# Patient Record
Sex: MALE | Race: WHITE | NOT HISPANIC OR LATINO | Employment: OTHER
[De-identification: names, ages, dates, MRNs, and addresses within clinical notes are randomized per-mention and may not be internally consistent; named-entity substitution may affect disease eponyms.]

---

## 2017-04-25 ENCOUNTER — RECORDS - HEALTHEAST (OUTPATIENT)
Dept: ADMINISTRATIVE | Facility: OTHER | Age: 67
End: 2017-04-25

## 2017-12-12 ENCOUNTER — RECORDS - HEALTHEAST (OUTPATIENT)
Dept: ADMINISTRATIVE | Facility: OTHER | Age: 67
End: 2017-12-12

## 2018-02-03 ENCOUNTER — HOSPITAL ENCOUNTER (EMERGENCY)
Facility: CLINIC | Age: 68
Discharge: HOME | End: 2018-02-03
Attending: EMERGENCY MEDICINE | Admitting: EMERGENCY MEDICINE
Payer: MEDICARE

## 2018-02-03 VITALS
RESPIRATION RATE: 18 BRPM | TEMPERATURE: 98 F | HEART RATE: 59 BPM | DIASTOLIC BLOOD PRESSURE: 97 MMHG | WEIGHT: 252.2 LBS | BODY MASS INDEX: 36.11 KG/M2 | HEIGHT: 70 IN | OXYGEN SATURATION: 97 % | SYSTOLIC BLOOD PRESSURE: 147 MMHG

## 2018-02-03 DIAGNOSIS — H20.9 UVEITIS, ANTERIOR: ICD-10-CM

## 2018-02-03 PROCEDURE — 99283 EMERGENCY DEPT VISIT LOW MDM: CPT | Mod: Z6 | Performed by: EMERGENCY MEDICINE

## 2018-02-03 PROCEDURE — 99282 EMERGENCY DEPT VISIT SF MDM: CPT | Performed by: EMERGENCY MEDICINE

## 2018-02-03 RX ORDER — CYANOCOBALAMIN 1000 UG/ML
1 INJECTION, SOLUTION INTRAMUSCULAR; SUBCUTANEOUS
COMMUNITY
End: 2022-11-21

## 2018-02-03 RX ORDER — POLYVINYL ALCOHOL 14 MG/ML
1 SOLUTION/ DROPS OPHTHALMIC
Qty: 15 ML | Refills: 0 | Status: SHIPPED | OUTPATIENT
Start: 2018-02-03 | End: 2021-11-17

## 2018-02-03 RX ORDER — VALACYCLOVIR HYDROCHLORIDE 1 G/1
1000 TABLET, FILM COATED ORAL 3 TIMES DAILY
Qty: 30 TABLET | Refills: 0 | Status: SHIPPED | OUTPATIENT
Start: 2018-02-03 | End: 2021-11-17

## 2018-02-03 RX ORDER — CIPROFLOXACIN HYDROCHLORIDE 3.5 MG/ML
1 SOLUTION/ DROPS TOPICAL 4 TIMES DAILY
COMMUNITY
End: 2021-11-17

## 2018-02-03 ASSESSMENT — VISUAL ACUITY
OD: 20/25
OS: 20/50

## 2018-02-03 ASSESSMENT — ENCOUNTER SYMPTOMS
FEVER: 0
DIARRHEA: 0
EYE DISCHARGE: 1
VOMITING: 0
EYE PAIN: 1
COUGH: 0
EYE REDNESS: 1
EYE ITCHING: 0

## 2018-02-03 NOTE — ED PROVIDER NOTES
History     Chief Complaint   Patient presents with     Eye Drainage     left eye     HPI  Efren Wynne is a 67 year old male with a history of type 2 diabetes, hypertension, hypothyroidism and hyperlipidemia who presents with left eye redness and irritation.  Of note, the patient was evaluated yesterday at the [facility] where he was diagnosed with a left corneal abrasion and prescribed ciprofloxacin eyedrops.  He then had an appointment at [facility] in [place] this morning with an ophthalmologist. 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  He notes that he has not yet started the Valtrex.  Patient notes that he was supposed to follow-up in the eye clinic again on Monday.  However, he is traveling out of the country on Monday and is not going to be able to follow up, so he became concerned and came here to the [place] ED for evaluation.  Currently, patient denies any foreign body sensation.  He complains of redness and irritation of the left eye as well as tearing.  He denies itching or vision changes.  Currently he rates his eye discomfort at 1/10.  Denies any fever.  He denies any recent known foreign body in his eye.  He does not wear contact lenses or glasses.  Patient reports that he has a history of a cataract procedure and Lasix on his left eye in the past.  Denies any recent illness, specifically denies fever, cough, vomiting or diarrhea.  Patient also denies any light sensitivity.    History reviewed. No pertinent past medical history.    History reviewed. No pertinent surgical history.    No family history on file.    Social History   Substance Use Topics     Smoking status: Never Smoker     Smokeless tobacco: Never Used     Alcohol use No     No current facility-administered medications for this encounter.      Current Outpatient Prescriptions   Medication     LISINOPRIL PO     METFORMIN HCL PO     GABAPENTIN PO     ROPINIROLE HCL  "PO     cyanocobalamin (VITAMIN B12) 1000 MCG/ML injection     ATORVASTATIN CALCIUM PO     SITagliptin Phosphate (JANUVIA PO)     ASPIRIN PO     ciprofloxacin (CILOXAN) 0.3 % ophthalmic solution     valACYclovir (VALTREX) 1000 mg tablet     polyvinyl alcohol (LIQUIFILM TEARS) 1.4 % ophthalmic solution        Allergies   Allergen Reactions     Iodide Hives      I have reviewed the Medications, Allergies, Past Medical and Surgical History, and Social History in the Epic system.    Review of Systems   Constitutional: Negative for fever.   Eyes: Positive for pain (left), discharge (tearing, left) and redness (left). Negative for itching and visual disturbance.   Respiratory: Negative for cough.    Gastrointestinal: Negative for diarrhea and vomiting.   All other systems reviewed and are negative.      Physical Exam   BP: 180/84  Pulse: 59  Heart Rate: 56  Temp: 98  F (36.7  C)  Resp: 18  Height: 177.8 cm (5' 10\")  Weight: 114.4 kg (252 lb 3.2 oz)  SpO2: 97 %  Visual Acuity-Left: 20/50  Visual Acuity-Right: 20/25      Physical Exam    GEN:  Alert, well developed, no acute distress  HEENT:  PERRL, EOMI, there is left eye conjunctival injection, mucous membranes are moist.  Visual acuity in the left eye is 20/50, in the right eye acuity is 20/25.  Fluoroscein exam is negative for uptake.  Slit-lamp exam is negative for foreign body but does show some cloudy spots on the cornea.  Musculoskeletal:  normal range of motion, no lower extremity swelling or calf tenderness  Neuro:  Alert and oriented X3, Follows commands, moving all extremities spontaneously   Skin:  Warm, dry     ED Course     ED Course     Procedures     10:23 AM  The patient was seen and examined by Dr. Luna in Room 23.             Critical Care time:  None  Patient was seen by ophthalmology in the emergency department and she suspects he has anterior uveitis versus keratitis.  She recommended Valtrex 1000 mg 3 times a day for 10 days.  This was prescribed to " him at the time of discharge as well as artificial tears.    Labs Ordered and Resulted from Time of ED Arrival Up to the Time of Departure from the ED - No data to display         Assessments & Plan (with Medical Decision Making)   Patient presents with left eye redness without much discomfort.  Since he does not have eye pain, glaucoma is unlikely.  He reports no change in vision, so doubt central retinal artery or vein occlusion.  Symptoms are more suggestive of inflammation or infection.  Exam is suggestive of anterior uveitis versus keratitis.  He will be treated with Valtrex p.o. as well as artificial tears.  Patient will continue the ciprofloxacin eyedrops that he was prescribed at urgent care earlier as well.  He was advised to return if any worsening or new symptoms and was advised to follow-up with an eye care provider in the next couple of days.    I have reviewed the nursing notes.    I have reviewed the findings, diagnosis, plan and need for follow up with the patient.    Discharge Medication List as of 2/3/2018  1:26 PM      START taking these medications    Details   polyvinyl alcohol (LIQUIFILM TEARS) 1.4 % ophthalmic solution Place 1 drop Into the left eye every 2 hours, Disp-15 mL, R-0, Local Print             Final diagnoses:   Uveitis, anterior     I, Valentin Presley, am serving as a trained medical scribe to document services personally performed by Mabel Luna MD, based on the provider's statements to me.   IMabel MD, was physically present and have reviewed and verified the accuracy of this note documented by Valentin Presley.     2/3/2018   [facility], EMERGENCY DEPARTMENT     Mabel Luna MD  02/04/18 8216

## 2018-02-03 NOTE — ED AVS SNAPSHOT
[facility], [place], Emergency Department    [address] [place] [id number]    Phone:  [phone]                                       Efren Wynne   MRN: [id number]    Department:  [facility], Emergency Department   Date of Visit:  2/3/2018           After Visit Summary Signature Page     I have received my discharge instructions, and my questions have been answered. I have discussed any challenges I see with this plan with the nurse or doctor.    ..........................................................................................................................................  Patient/Patient Representative Signature      ..........................................................................................................................................  Patient Representative Print Name and Relationship to Patient    ..................................................               ................................................  Date                                            Time    ..........................................................................................................................................  Reviewed by Signature/Title    ...................................................              ..............................................  Date                                                            Time

## 2018-02-03 NOTE — ED NOTES
"Triage Assessment & Note:    /84  Pulse 59  Temp 98  F (36.7  C) (Oral)  Resp 18  Ht 1.778 m (5' 10\")  Wt 114.4 kg (252 lb 3.2 oz)  SpO2 97%  BMI 36.19 kg/m2      Patient presents with: Pt comes to triage from eye doctor for eye drainage on left eye.  Pt was given cipro eye drops and PO valtrex. Pt is to go on vacation on Monday and is concerned about going. No reports of visual changes, fever, cough, or SOB    Home Treatments/Remedies: home medication    Febrile / Afebrile: afebrile    Duration of C/o: 24 hours    Sofy Serra RN  February 3, 2018        "

## 2018-02-03 NOTE — DISCHARGE INSTRUCTIONS
Continue the ciprofloxacin eyedrop.  You should also use artificial tears every 2 hours while you are awake.  There is a new prescription for Valtrex at a higher dose than what you were prescribed initially.  Please take the higher dose.  Return to the emergency department or see an eye care provider if you have worsening vision, increased redness, drainage, fever, eye pain, any other concerns.    Please make an appointment to follow up with your eye care provider in 2 days or as soon as possible after you return from traveling.

## 2018-02-03 NOTE — CONSULTS
"  OPHTHALMOLOGY CONSULT NOTE  02/03/18    Patient: Efren Wynne  Consulted by: ED staff  Reason for Consult: left eye redness/irritation    HISTORY OF PRESENTING ILLNESS:     Efren Wynne is a 67 year old male with history of T2DM, HTN, hypothyroidism, and hyperlipidemia who presents to the ED c/o left eye redness and irritation. He reports onset of foreign body sensation yesterday and was evaluated at Urgent Care and was started on cipro drops 4x daily in the left eye. He was referred to be seen by an eye doctor at [facility] in [place] this morning, where he was diagnosed with HSV keratitis, prescribed oral valtrex, and told to continue the cipro drops. He was advised to follow up on Monday, but he became concerned because he is leaving for a vacation in [place] on Monday, so he decided to come to the ED for evaluation.    He reports left eye redness and mild discomfort but denies change in vision, flashes, or new floaters.    He has a history of laser in situ keratomileusis (LASIK) both eyes x2 (20yrs ago) and cataract surgery both eyes (3yrs ago). Also reports having a \"corneal scar\" in the left eye for the past 20 years - unsure circumstances of onset. He denies contact lens use.    Review of systems were otherwise negative except for that which has been stated above.      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 No pertinent past medical history.    History reviewed. No pertinent surgical history.    EXAMINATION:     Visual Acuity (near card without correction - has monovision after cataract surgery with LEFT eye set to near): Right Eye 20/60 ; Left Eye 20/25-2   Pupils: Equal and reactive to light and accomodation with no afferent pupillary defect.  Intraocular Pressure: RE 10 ; LE 8  mmHg by tonopen (<5% error).   Motility: RE " "Full; LE Full  Confrontational Visual Field: RE Full; LE Full     External/Slit Lamp Exam   RIGHT EYE   Lids/Lashes: No Abnormality   Conj/Sclera: White and Quiet   Cornea:  Clear   Ant Chamber:  Deep and Quiet   Iris: Round and Reactive   Lens: IOL   LEFT   Lids/lashes: No Abnormality   Conj/Sclera: 1-2+ injection   Cornea:  Clear, 3 small areas of epithelial irregularity (?early dendrites) in inferotemporal mid-periphery, mid-peripheral ring of stromal infiltrates sparing the superior cornea    Ant Chamber: 1+ cell, 1+ flare   Iris: Round and Reactive, no TIDs   Lens: IOL    ASSESSMENT/PLAN:     Efren Wynne is a 67 year old male with the following diagnoses:    Keratitis vs anterior uveitis, left eye  -Patient with area of epi irregularity (?early dendrites) and stromal infiltrates (unclear if old vs new as patient reports history of long-standing \"corneal scar\" in the left eye)  -Patient reports mild pain and denies vision changes (history of monovision cataract surgery), VA good in the left eye today  -Mild injection and AC inflammation present  -Differential diagnosis includes corneal keratitis vs old corneal scar + anterior uveitis  -Discussed with cornea fellow and recommend prophylactic treatment with oral valtrex (1g three times a day x 10d)  - Also recommend continuing cipro drops 4x daily in the left eye and starting preservative free artificial tears q1-2 hours in the left eye  -Patient leaving for 1-week vacation in 2 days - will not start steroid eyedrops and we discussed symptoms that should prompt him to see an eye doctor including worsening eye pain, change in vision, or other concerning symptoms  -Recommend follow up with ophthalmologist upon return from vacation    It is our pleasure to participate in this patient's care and treatment. Please contact us with any further questions or concerns.    Discussed with Dr. Jose Hurtado MD  PGY-3 Ophthalmology      "

## 2018-02-03 NOTE — ED AVS SNAPSHOT
[facility], Emergency Department    [address] [address]    Phone:  [phone]                                       Efren Wynne   MRN: [id number]    Department:  [facility], Emergency Department   Date of Visit:  2/3/2018           Patient Information     Date Of Birth          1950        Your diagnoses for this visit were:     Uveitis, anterior        You were seen by Mabel Luna MD.        Discharge Instructions       Continue the ciprofloxacin eyedrop.  You should also use artificial tears every 2 hours while you are awake.  There is a new prescription for Valtrex at a higher dose than what you were prescribed initially.  Please take the higher dose.  Return to the emergency department or see an eye care provider if you have worsening vision, increased redness, drainage, fever, eye pain, any other concerns.    Please make an appointment to follow up with your eye care provider in 2 days or as soon as possible after you return from traveling.      Discharge References/Attachments     UVEITIS (ENGLISH)      24 Hour Appointment Hotline       To make an appointment at any [facility], call [phone] ([phone]). If you don't have a family doctor or clinic, we will help you find one. [facility] clinics are conveniently located to serve the needs of you and your family.             Review of your medicines      START taking        Dose / Directions Last dose taken    polyvinyl alcohol 1.4 % ophthalmic solution   Commonly known as:  LIQUIFILM TEARS   Dose:  1 drop   Quantity:  15 mL        Place 1 drop Into the left eye every 2 hours   Refills:  0          CONTINUE these medicines which may have CHANGED, or have new prescriptions. If we are uncertain of the size of tablets/capsules you have at home, strength may be listed as something that might have changed.        Dose / Directions Last dose taken    valACYclovir 1000 mg tablet   Commonly known as:  VALTREX    Dose:  1000 mg   What changed:    - medication strength  - how much to take   Quantity:  30 tablet        Take 1 tablet (1,000 mg) by mouth 3 times daily for 10 days   Refills:  0          Our records show that you are taking the medicines listed below. If these are incorrect, please call your family doctor or clinic.        Dose / Directions Last dose taken    ASPIRIN PO        Refills:  0        ATORVASTATIN CALCIUM PO        Refills:  0        ciprofloxacin 0.3 % ophthalmic solution   Commonly known as:  CILOXAN   Dose:  1 drop        Place 1 drop Into the left eye 4 times daily   Refills:  0        cyanocobalamin 1000 MCG/ML injection   Commonly known as:  VITAMIN B12   Dose:  1 mL        Inject 1 mL into the muscle every 30 days   Refills:  0        GABAPENTIN PO        Refills:  0        JANUVIA PO        Refills:  0        LISINOPRIL PO        Refills:  0        METFORMIN HCL PO        Refills:  0        ROPINIROLE HCL PO        Refills:  0                Prescriptions were sent or printed at these locations (2 Prescriptions)                   Other Prescriptions                Printed at Department/Unit printer (2 of 2)         valACYclovir (VALTREX) 1000 mg tablet               polyvinyl alcohol (LIQUIFILM TEARS) 1.4 % ophthalmic solution                Procedures and tests performed during your visit     Visual Acuity      Orders Needing Specimen Collection     None      Pending Results     No orders found from 2/1/2018 to 2/4/2018.            Pending Culture Results     No orders found from 2/1/2018 to 2/4/2018.            Pending Results Instructions     If you had any lab results that were not finalized at the time of your Discharge, you can call the ED Lab Result RN at [phone]. You will be contacted by this team for any positive Lab results or changes in treatment. The nurses are available 7 days a week from 10A to 6:30P.  You can leave a message 24 hours per day and they will return your  "call.        Thank you for choosing [facility]       Thank you for choosing [facility] for your care. Our goal is always to provide you with excellent care. Hearing back from our patients is one way we can continue to improve our services. Please take a few minutes to complete the written survey that you may receive in the mail after you visit with us. Thank you!        [employer] Information     [employer] lets you send messages to your doctor, view your test results, renew your prescriptions, schedule appointments and more. To sign up, go to [email or web address] . Click on \"Log in\" on the left side of the screen, which will take you to the Welcome page. Then click on \"Sign up Now\" on the right side of the page.     You will be asked to enter the access code listed below, as well as some personal information. Please follow the directions to create your username and password.     Your access code is: [email or web address]  Expires: 2018  1:25 PM     Your access code will  in 90 days. If you need help or a new code, please call your [facility] clinic or [phone].        Care EveryWhere ID     This is your Care EveryWhere ID. This could be used by other organizations to access your [facility] medical records  [id number]        Equal Access to Services     ALYCE STOCK : Hadrosi riggso Sodaria, waaxda luqadaha, qaybta kaalmada adeegyada, jana bai. So [facility] [phone].    ATENCIÓN: Si habla español, tiene a enrique disposición servicios gratuitos de asistencia lingüística. Llame al [phone].    We comply with applicable federal civil rights laws and [place] laws. We do not discriminate on the basis of race, color, national origin, age, disability, sex, sexual orientation, or gender identity.            After Visit Summary       This is your record. Keep this with you and show to your community pharmacist(s) and doctor(s) at your next visit.                  "

## 2018-07-30 ENCOUNTER — RECORDS - HEALTHEAST (OUTPATIENT)
Dept: ADMINISTRATIVE | Facility: OTHER | Age: 68
End: 2018-07-30

## 2019-03-08 ENCOUNTER — RECORDS - HEALTHEAST (OUTPATIENT)
Dept: ADMINISTRATIVE | Facility: OTHER | Age: 69
End: 2019-03-08

## 2020-01-16 ENCOUNTER — COMMUNICATION - HEALTHEAST (OUTPATIENT)
Dept: TELEHEALTH | Facility: CLINIC | Age: 70
End: 2020-01-16

## 2020-01-16 ENCOUNTER — OFFICE VISIT - HEALTHEAST (OUTPATIENT)
Dept: INTERNAL MEDICINE | Facility: CLINIC | Age: 70
End: 2020-01-16

## 2020-01-16 DIAGNOSIS — Z79.4 TYPE 2 DIABETES MELLITUS WITHOUT COMPLICATION, WITH LONG-TERM CURRENT USE OF INSULIN (H): ICD-10-CM

## 2020-01-16 DIAGNOSIS — Z98.84 HISTORY OF GASTRIC BYPASS: ICD-10-CM

## 2020-01-16 DIAGNOSIS — E78.5 HYPERLIPIDEMIA, UNSPECIFIED HYPERLIPIDEMIA TYPE: ICD-10-CM

## 2020-01-16 DIAGNOSIS — I10 ESSENTIAL HYPERTENSION: ICD-10-CM

## 2020-01-16 DIAGNOSIS — E11.9 TYPE 2 DIABETES MELLITUS WITHOUT COMPLICATION, WITH LONG-TERM CURRENT USE OF INSULIN (H): ICD-10-CM

## 2020-01-16 DIAGNOSIS — G25.81 RESTLESS LEGS SYNDROME: ICD-10-CM

## 2020-01-16 DIAGNOSIS — Z85.51 HISTORY OF BLADDER CANCER: ICD-10-CM

## 2020-01-16 DIAGNOSIS — N52.9 MALE ERECTILE DISORDER: ICD-10-CM

## 2020-01-16 DIAGNOSIS — E53.8 B12 DEFICIENCY: ICD-10-CM

## 2020-01-16 DIAGNOSIS — Z23 FLU VACCINE NEED: ICD-10-CM

## 2020-01-16 ASSESSMENT — MIFFLIN-ST. JEOR: SCORE: 1746.69

## 2020-06-22 ENCOUNTER — RECORDS - HEALTHEAST (OUTPATIENT)
Dept: ADMINISTRATIVE | Facility: OTHER | Age: 70
End: 2020-06-22

## 2020-07-27 ENCOUNTER — AMBULATORY - HEALTHEAST (OUTPATIENT)
Dept: OTHER | Facility: CLINIC | Age: 70
End: 2020-07-27

## 2020-07-27 ENCOUNTER — DOCUMENTATION ONLY (OUTPATIENT)
Dept: OTHER | Facility: CLINIC | Age: 70
End: 2020-07-27

## 2020-09-03 ENCOUNTER — RECORDS - HEALTHEAST (OUTPATIENT)
Dept: GENERAL RADIOLOGY | Facility: CLINIC | Age: 70
End: 2020-09-03

## 2020-09-03 ENCOUNTER — OFFICE VISIT - HEALTHEAST (OUTPATIENT)
Dept: INTERNAL MEDICINE | Facility: CLINIC | Age: 70
End: 2020-09-03

## 2020-09-03 DIAGNOSIS — R07.81 PLEURODYNIA: ICD-10-CM

## 2020-09-03 DIAGNOSIS — R07.81 RIB PAIN ON RIGHT SIDE: ICD-10-CM
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
## 2020-09-03 DIAGNOSIS — G25.81 RESTLESS LEGS SYNDROME: ICD-10-CM

## 2020-09-03 ASSESSMENT — MIFFLIN-ST. JEOR: SCORE: 1705.86

## 2020-10-06 ENCOUNTER — AMBULATORY - HEALTHEAST (OUTPATIENT)
Dept: MULTI SPECIALTY CLINIC | Facility: CLINIC | Age: 70
End: 2020-10-06

## 2020-10-06 LAB — HBA1C MFR BLD: 7.2 % (ref 0–5.6)

## 2020-10-12 ENCOUNTER — OFFICE VISIT - HEALTHEAST (OUTPATIENT)
Dept: INTERNAL MEDICINE | Facility: CLINIC | Age: 70
End: 2020-10-12

## 2020-10-12 ENCOUNTER — COMMUNICATION - HEALTHEAST (OUTPATIENT)
Dept: INTERNAL MEDICINE | Facility: CLINIC | Age: 70
End: 2020-10-12

## 2020-10-12 DIAGNOSIS — E53.8 B12 DEFICIENCY: ICD-10-CM

## 2020-10-12 DIAGNOSIS — Z12.11 SPECIAL SCREENING FOR MALIGNANT NEOPLASMS, COLON: ICD-10-CM

## 2020-10-12 DIAGNOSIS — Z79.4 TYPE 2 DIABETES MELLITUS WITHOUT COMPLICATION, WITH LONG-TERM CURRENT USE OF INSULIN (H): ICD-10-CM

## 2020-10-12 DIAGNOSIS — I10 ESSENTIAL HYPERTENSION: ICD-10-CM

## 2020-10-12 DIAGNOSIS — Z98.84 HISTORY OF GASTRIC BYPASS: ICD-10-CM

## 2020-10-12 DIAGNOSIS — Z00.00 ENCOUNTER FOR MEDICARE ANNUAL WELLNESS EXAM: ICD-10-CM

## 2020-10-12 DIAGNOSIS — G25.81 RESTLESS LEGS SYNDROME: ICD-10-CM

## 2020-10-12 DIAGNOSIS — Z12.5 SCREENING FOR PROSTATE CANCER: ICD-10-CM

## 2020-10-12 DIAGNOSIS — E78.5 HYPERLIPIDEMIA, UNSPECIFIED HYPERLIPIDEMIA TYPE: ICD-10-CM

## 2020-10-12 DIAGNOSIS — E11.9 TYPE 2 DIABETES MELLITUS WITHOUT COMPLICATION, WITH LONG-TERM CURRENT USE OF INSULIN (H): ICD-10-CM

## 2020-10-12 DIAGNOSIS — G63 POLYNEUROPATHY ASSOCIATED WITH UNDERLYING DISEASE (H): ICD-10-CM

## 2020-10-12 DIAGNOSIS — Z85.51 HISTORY OF BLADDER CANCER: ICD-10-CM

## 2020-10-12 LAB
ALBUMIN SERPL-MCNC: 4 G/DL (ref 3.5–5)
ALBUMIN UR-MCNC: NEGATIVE MG/DL
ALP SERPL-CCNC: 104 U/L (ref 45–120)
ALT SERPL W P-5'-P-CCNC: 18 U/L (ref 0–45)
ANION GAP SERPL CALCULATED.3IONS-SCNC: 8 MMOL/L (ref 5–18)
APPEARANCE UR: CLEAR
AST SERPL W P-5'-P-CCNC: 21 U/L (ref 0–40)
BILIRUB SERPL-MCNC: 0.8 MG/DL (ref 0–1)
BILIRUB UR QL STRIP: NEGATIVE
BUN SERPL-MCNC: 12 MG/DL (ref 8–28)
CALCIUM SERPL-MCNC: 9 MG/DL (ref 8.5–10.5)
CHLORIDE BLD-SCNC: 102 MMOL/L (ref 98–107)
CHOLEST SERPL-MCNC: 103 MG/DL
CO2 SERPL-SCNC: 28 MMOL/L (ref 22–31)
COLOR UR AUTO: YELLOW
CREAT SERPL-MCNC: 0.8 MG/DL (ref 0.7–1.3)
ERYTHROCYTE [DISTWIDTH] IN BLOOD BY AUTOMATED COUNT: 12.9 % (ref 11–14.5)
FASTING STATUS PATIENT QL REPORTED: YES
GFR SERPL CREATININE-BSD FRML MDRD: >60 ML/MIN/1.73M2
GLUCOSE BLD-MCNC: 118 MG/DL (ref 70–125)
GLUCOSE UR STRIP-MCNC: NEGATIVE MG/DL
HCT VFR BLD AUTO: 42.2 % (ref 40–54)
HDLC SERPL-MCNC: 51 MG/DL
HGB BLD-MCNC: 14.2 G/DL (ref 14–18)
HGB UR QL STRIP: NEGATIVE
KETONES UR STRIP-MCNC: NEGATIVE MG/DL
LDLC SERPL CALC-MCNC: 42 MG/DL
LEUKOCYTE ESTERASE UR QL STRIP: NEGATIVE
MCH RBC QN AUTO: 28.2 PG (ref 27–34)
MCHC RBC AUTO-ENTMCNC: 33.6 G/DL (ref 32–36)
MCV RBC AUTO: 84 FL (ref 80–100)
NITRATE UR QL: NEGATIVE
PH UR STRIP: 5.5 [PH] (ref 5–8)
PLATELET # BLD AUTO: 186 THOU/UL (ref 140–440)
PMV BLD AUTO: 7.3 FL (ref 7–10)
POTASSIUM BLD-SCNC: 4.2 MMOL/L (ref 3.5–5)
PROT SERPL-MCNC: 6.8 G/DL (ref 6–8)
PSA SERPL-MCNC: 1.5 NG/ML (ref 0–6.5)
RBC # BLD AUTO: 5.02 MILL/UL (ref 4.4–6.2)
SODIUM SERPL-SCNC: 138 MMOL/L (ref 136–145)
SP GR UR STRIP: 1.01 (ref 1–1.03)
TRIGL SERPL-MCNC: 52 MG/DL
UROBILINOGEN UR STRIP-ACNC: NORMAL
VIT B12 SERPL-MCNC: 438 PG/ML (ref 213–816)
WBC: 5.9 THOU/UL (ref 4–11)

## 2020-10-12 ASSESSMENT — MIFFLIN-ST. JEOR: SCORE: 1719.47

## 2020-10-20 ENCOUNTER — COMMUNICATION - HEALTHEAST (OUTPATIENT)
Dept: INTERNAL MEDICINE | Facility: CLINIC | Age: 70
End: 2020-10-20

## 2020-12-21 ENCOUNTER — AMBULATORY - HEALTHEAST (OUTPATIENT)
Dept: MULTI SPECIALTY CLINIC | Facility: CLINIC | Age: 70
End: 2020-12-21

## 2020-12-21 LAB — RETINOPATHY: POSITIVE

## 2021-01-24 ENCOUNTER — COMMUNICATION - HEALTHEAST (OUTPATIENT)
Dept: INTERNAL MEDICINE | Facility: CLINIC | Age: 71
End: 2021-01-24

## 2021-01-24 DIAGNOSIS — Z79.4 TYPE 2 DIABETES MELLITUS WITHOUT COMPLICATION, WITH LONG-TERM CURRENT USE OF INSULIN (H): ICD-10-CM

## 2021-01-24 DIAGNOSIS — E11.9 TYPE 2 DIABETES MELLITUS WITHOUT COMPLICATION, WITH LONG-TERM CURRENT USE OF INSULIN (H): ICD-10-CM

## 2021-01-24 DIAGNOSIS — I10 ESSENTIAL HYPERTENSION: ICD-10-CM

## 2021-01-25 ENCOUNTER — COMMUNICATION - HEALTHEAST (OUTPATIENT)
Dept: INTERNAL MEDICINE | Facility: CLINIC | Age: 71
End: 2021-01-25

## 2021-01-28 ENCOUNTER — COMMUNICATION - HEALTHEAST (OUTPATIENT)
Dept: INTERNAL MEDICINE | Facility: CLINIC | Age: 71
End: 2021-01-28

## 2021-06-02 ENCOUNTER — RECORDS - HEALTHEAST (OUTPATIENT)
Dept: ADMINISTRATIVE | Facility: CLINIC | Age: 71
End: 2021-06-02

## 2021-06-04 VITALS
WEIGHT: 219 LBS | HEIGHT: 70 IN | DIASTOLIC BLOOD PRESSURE: 80 MMHG | OXYGEN SATURATION: 98 % | SYSTOLIC BLOOD PRESSURE: 132 MMHG | HEART RATE: 60 BPM | BODY MASS INDEX: 31.35 KG/M2

## 2021-06-04 VITALS
OXYGEN SATURATION: 98 % | HEIGHT: 70 IN | BODY MASS INDEX: 30.06 KG/M2 | HEART RATE: 70 BPM | WEIGHT: 210 LBS | DIASTOLIC BLOOD PRESSURE: 66 MMHG | SYSTOLIC BLOOD PRESSURE: 122 MMHG | RESPIRATION RATE: 16 BRPM

## 2021-06-05 VITALS
HEIGHT: 70 IN | DIASTOLIC BLOOD PRESSURE: 62 MMHG | BODY MASS INDEX: 30.49 KG/M2 | HEART RATE: 58 BPM | SYSTOLIC BLOOD PRESSURE: 120 MMHG | OXYGEN SATURATION: 97 % | TEMPERATURE: 97.8 F | WEIGHT: 213 LBS

## 2021-06-05 NOTE — PROGRESS NOTES
Office Visit - New Patient   Efren Wynne   69 y.o.  male    Date of visit: 1/16/2020  Physician: Heraclio Meng MD     Assessment and Plan   1. Essential hypertension  Uncontrolled.  Continue same regimen.  - lisinopril-hydrochlorothiazide (ZESTORETIC) 20-12.5 mg per tablet; Take 1 tablet by mouth daily.  Dispense: 90 tablet; Refill: 3  - atorvastatin (LIPITOR) 20 MG tablet; Take 1 tablet (20 mg total) by mouth every morning.  Dispense: 90 tablet; Refill: 3    2. Type 2 diabetes mellitus without complication, with long-term current use of insulin (H)  Continue to work hard on diet and exercise and continue regimen per Dr. Candelario.  He is going to continue that care.  I did tell patient that if his endocrinologist does retire I can take over the diabetes care.  - lisinopril-hydrochlorothiazide (ZESTORETIC) 20-12.5 mg per tablet; Take 1 tablet by mouth daily.  Dispense: 90 tablet; Refill: 3  - atorvastatin (LIPITOR) 20 MG tablet; Take 1 tablet (20 mg total) by mouth every morning.  Dispense: 90 tablet; Refill: 3    3. Restless legs syndrome  Good control with his current regimen he tells me.  Continue to follow with a sleep physician.    4. Hyperlipidemia, unspecified hyperlipidemia type  Presumably well controlled.  Continue statin.  Labs will be done at next visit.    5. History of gastric bypass  He is done remarkable job at weight loss.  Congratulated him on his hard work.    6. History of bladder cancer  He follows regularly with Dr. Rodriguez.  We will try to get those records.    7. B12 deficiency  Continue his monthly B12.    8. Flu vaccine need    - Influenza High Dose,Seasonal,PF 65+ Yrs    We discussed his erectile dysfunction today.  He can get partial erection and is able to have orgasm.  I counseled him on potentially trying some Viagra to but he declines at this time.    Return in about 6 months (around 7/16/2020) for AWV.     Chief Complaint   Chief Complaint   Patient presents with     Establish  Care     referred by friend (Rik Patrick) - previous [facility]         Patient Profile   Social History     Social History Narrative     Not on file        Past Medical History   Patient Active Problem List   Diagnosis     B12 deficiency     Dyslipidemia     Essential hypertension     History of bladder cancer     Hyperlipidemia     RBBB (right bundle branch block)     Restless legs syndrome     Type 2 diabetes mellitus without complication, with long-term current use of insulin (H)     History of gastric bypass       Past Surgical History  He has a past surgical history that includes Keratoplasty (2007,2010); Gastric bypass (1983); Vascular surgery; Cataract extraction, bilateral (11/13); and Transurethral resection of bladder tumor (11/2010).     History of Present Illness   This 69 y.o. old very pleasant new patient to the clinic here referred by his friend Rik Nguyen.  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  He lives in [place] part-time and then commutes back and forth here to his townhouse.  He moved there originally after his care home to be in warmer climate but got bored and decided to go back to work.  He enjoys being down there and swims once or twice a day.  He has a host of medical problems including history of bladder cancer for which he follows with Dr. Merrick Rodriguez.  He has hypertension and diabetes.  He follows with Dr. Nestor Candelario for his diabetes.  Sugars have been up and down over the years but most recent A1c was around 7.  He has hypothyroidism.  He has restless legs for which she is on a couple different medications gabapentin and ropinirole.  He has B12 deficiency thought due to a gastric bypass.  He states that he had a stapling procedure and not a full Sharri-en-Y.  His maximum weight was over 300 pounds.  He is now at 219.  He works very vigorously at but enjoys food and does do some  "binging he tells me.    He notes some erectile dysfunction but does not wish to take anything for it at this time.    [place].  Went to school in grad school at [employer].  He is a gonzalez man.  Currently not in a relationship.  No desire to be in a relationship.  He is a former smoker quitting many years ago.  Nondrinker.  Enjoys swimming and spending time with friends.    Review of Systems: A comprehensive review of systems was negative except as noted.     Medications and Allergies   Current Outpatient Medications   Medication Sig Dispense Refill     atorvastatin (LIPITOR) 20 MG tablet Take 1 tablet (20 mg total) by mouth every morning. 90 tablet 3     cyanocobalamin 1,000 mcg/mL injection Inject 1,000 mcg into the shoulder, thigh, or buttocks every 30 (thirty) days.        gabapentin (NEURONTIN) 300 MG capsule Take 1,200 mg by mouth at bedtime.        lisinopril (PRINIVIL,ZESTRIL) 20 MG tablet Take 20 mg by mouth daily.       metFORMIN (GLUCOPHAGE) 500 MG tablet Take 1,000 mg by mouth 2 (two) times a day with meals.       pen needle, diabetic 31 gauge x 3/16\" Ndle Use once daily with Tresiba       rOPINIRole (REQUIP) 1 MG tablet Take 7 mg by mouth at bedtime.        sitaGLIPtin (JANUVIA) 100 MG tablet Take 100 mg by mouth daily.       TRESIBA FLEXTOUCH U-200 200 unit/mL (3 mL) InPn Inject 40 Units as directed at bedtime.       lisinopril-hydrochlorothiazide (ZESTORETIC) 20-12.5 mg per tablet Take 1 tablet by mouth daily. 90 tablet 3     No current facility-administered medications for this visit.      Allergies   Allergen Reactions     Iodinated Contrast Media Itching        Family and Social History   Family History   Problem Relation Age of Onset     Anesthesia problems Neg Hx         Social History     Tobacco Use     Smoking status: Former Smoker     Last attempt to quit: 8/3/1980     Years since quittin.4     Smokeless tobacco: Never Used   Substance Use Topics     " "Alcohol use: No     Drug use: No        Physical Exam   General Appearance:   Pleasant obese gentleman in no distress.    /80 (Patient Site: Right Arm, Patient Position: Sitting, Cuff Size: Adult Regular)   Pulse 60   Ht 5' 9.5\" (1.765 m)   Wt 219 lb (99.3 kg)   SpO2 98%   BMI 31.88 kg/m      EYES: Eyelids, conjunctiva, and sclera were normal. Pupils were normal. Cornea, iris, and lens were normal bilaterally.  HEAD, EARS, NOSE, MOUTH, AND THROAT: Head and face were normal. Hearing was normal to voice and the ears were normal to external exam. Nose appearance was normal and there was no discharge. Oropharynx was normal.  NECK: Neck appearance was normal. There were no neck masses and the thyroid was not enlarged.  RESPIRATORY: Breathing pattern was normal and the chest moved symmetrically.  Percussion/auscultatory percussion was normal.  Lung sounds were normal and there were no abnormal sounds.  CARDIOVASCULAR: Heart rate and rhythm were normal.  S1 and S2 were normal and there were no extra sounds or murmurs. Peripheral pulses in arms and legs were normal.  Jugular venous pressure was normal.  There was no peripheral edema.  GASTROINTESTINAL: The abdomen was obese in contour.  Bowel sounds were present.  Percussion detected no organ enlargement or tenderness.  Palpation detected no tenderness, mass, or enlarged organs.   MUSCULOSKELETAL: Skeletal configuration was normal and muscle mass was normal for age. Joint appearance was overall normal.  LYMPHATIC: There were no enlarged nodes.  SKIN/HAIR/NAILS: Skin color was normal.   Hair and nails were normal.  NEUROLOGIC: The patient was alert and oriented to person, place, time, and circumstance. Speech was normal. Cranial nerves were normal. Motor strength was normal for age. The patient was normally coordinated.  PSYCHIATRIC:  Mood and affect were normal and the patient had normal recent and remote memory. The patient's judgment and insight were " normal.    ADDITIONAL VITAL SIGNS: none  CHEST WALL/BREASTS: def    RECTAL: def  GENITAL/URINARY: def     Additional Information     Review and/or order of clinical lab tests:  Review and/or order of radiology tests:  Review and/or order of medicine tests:  Discussion of test results with performing physician:  Decision to obtain old records and/or obtain history from someone other than the patient:  Review and summarization of old records and/or obtaining history from someone other than the patient and.or discussion of case with another health care provider:  Independent visualization of image, tracing or specimen itself:    Time: total time spent with the patient was 45 minutes of which >50% was spent in counseling and coordination of care     Heraclio Meng MD  Internal Medicine  Contact me at [phone]

## 2021-06-11 NOTE — PATIENT INSTRUCTIONS - HE
"1. O'K to take Tylenol, less then 4,000 mg a day.  1000 mg of plain Tylenol, 2-3 times a day.    Tylenol pm, only use 1-2 tasb and only at bedtime.Try not to take it for too long. Gabapentin, Requip already can be sedating and causing balance problems. Adding benadryl to it (which is part of Tylenol pm) can make it worse.     If pan is nor controlled, O\"k to take Ibuprofen 400 mg 2ce a day as needed    2. Will check rib XR today    3. Follow up with Dr bourgeois in [facility], have flu shot then,can also check vit d levels.  "
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
## 2021-06-12 NOTE — TELEPHONE ENCOUNTER
According to the report I received in my box, his last colonoscopy was in 2011 which means he would be due soon, in this next year.  Patient himself told me he had one not that long ago.  Please clarify.  Did he have one somewhere else or was this 1 in 2011 truly his last one?

## 2021-06-12 NOTE — TELEPHONE ENCOUNTER
Spoke with patient and relayed below information. He states that he is sorry if he made it seem like it was sooner. He has never went anywhere else for his colonoscopy, so this is accurate and he will get it scheduled for next year when due.     Genia Plascencia, CMA

## 2021-06-12 NOTE — TELEPHONE ENCOUNTER
Reports placed in your in box yesterday by Anabel. Should patient schedule his colonoscopy? Thank you.    Genia Plascencia, CMA

## 2021-06-12 NOTE — TELEPHONE ENCOUNTER
That is correct.  We were awaiting results of previous colonoscopy.  If he is not due then this should be canceled.

## 2021-06-12 NOTE — PROGRESS NOTES
Assessment and Plan:   1. Encounter for Medicare annual wellness exam  Patient will bring in his healthcare directive.  Flu shot was updated today.  Will clarify when his colonoscopy is and we will also update his PSA if it is due.    2. Polyneuropathy associated with underlying disease (H)  I have suggested that he could try some topical creams such as capsaicin or lidocaine.  He is resistant to that.  He will discuss going up on his gabapentin with his neurologist.    3. B12 deficiency  Continue IM B12.  - HM2(CBC w/o Differential)  - Vitamin B12    4. Essential hypertension  Excellent control.  Continue regimen.    5. History of bladder cancer  Follows with Dr. Rodriguez yearly.  Continue same.    6. History of gastric bypass  Doing well with his weight loss and maintaining his weight.    7. Hyperlipidemia, unspecified hyperlipidemia type  Lipids today for monitoring.    8. Restless legs syndrome  Controlled.  Continue to follow with neurology.    9. Type 2 diabetes mellitus without complication, with long-term current use of insulin (H)  Good sugar control he tells me.  - Lipid Cascade  - Comprehensive Metabolic Panel  - Urinalysis-UC if Indicated    10. Screening for prostate cancer    - PSA, Annual Screen (Prostatic-Specific Antigen)    11. Special screening for malignant neoplasms, colon    - Ambulatory referral for Colonoscopy  Patient has been advised of split billing requirements and indicates understanding: Yes      The patient's current medical problems were reviewed.    I have had an Advance Directives discussion with the patient.  The following health maintenance schedule was reviewed with the patient and provided in printed form in the after visit summary:   Health Maintenance   Topic Date Due     HEPATITIS C SCREENING  1950     DIABETIC FOOT EXAM  1950     COLORECTAL CANCER SCREENING  10/11/1967     HEPATITIS B VACCINES (1 of 3 - Risk 3-dose series) 10/11/1968     A1C  12/24/2019      INFLUENZA VACCINE RULE BASED (1) 08/01/2020     BMP  09/04/2020     LIPID  09/24/2020     DIABETIC EYE EXAM  11/11/2020     MEDICARE ANNUAL WELLNESS VISIT  10/12/2021     FALL RISK ASSESSMENT  10/12/2021     ADVANCE CARE PLANNING  07/27/2025     TD 18+ HE  01/31/2030     Pneumococcal Vaccine: 65+ Years  Completed     ZOSTER VACCINES  Completed     Pneumococcal Vaccine: Pediatrics (0 to 5 Years) and At-Risk Patients (6 to 64 Years)  Aged Out     MICROALBUMIN  Discontinued        Subjective:   Chief Complaint: Efren Wynne is an 70 y.o. male here for an Annual Wellness visit.   HPI: Patient comes in today for his annual wellness.  He is a new patient to me having seen me just 1 time before.  He has diabetes and history of morbid obesity.  He is lost over 100 pounds with gastric bypass.  He feels well.  He has a binge eating disorder.  Has a hard time keeping his sugars real good.  He has a neuropathy which is likely due to his diabetes but could be due to B12 deficiency as well.  Follows with Dr. Hyde from neuro and neurologic.  Also has a remote history of bladder cancer and follows with Dr. Rodriguez.  Denies chest pains or shortness of breath.  The neuropathy is painful for him.  Pins-and-needles.  He uses the gabapentin at bedtime for that and his restless legs.  He has issues during the day and wonders what more he can do.  Otherwise no new concerns.  He is no longer working in [employer] and he gave up his apartment there.  He will be here this winter and gets sad thinking about not being there during the winter months.    Review of Systems:    Please see above.  The rest of the review of systems are negative for all systems.    Patient Care Team:  Heraclio Meng MD as PCP - General (Internal Medicine)  Heraclio Meng MD as Assigned PCP  Barrett, Camden ANTHONY MD as Physician (Neurology)     Patient Active Problem List   Diagnosis     B12 deficiency     Dyslipidemia     Essential hypertension     History of bladder  cancer     Hyperlipidemia     RBBB (right bundle branch block)     Restless legs syndrome     Type 2 diabetes mellitus without complication, with long-term current use of insulin (H)     History of gastric bypass     Male erectile disorder     Peripheral neuropathy     Past Medical History:   Diagnosis Date     Cancer (H)     bladder     Diabetes mellitus (H)      Disease of thyroid gland      Hordeolum externum      Hypertension      Intestinal malabsorption      Obesity      Restless leg      Vitamin B 12 deficiency       Past Surgical History:   Procedure Laterality Date     CATARACT EXTRACTION, BILATERAL       GASTRIC BYPASS  1983    stapling     KERATOPLASTY  ,     TRANSURETHRAL RESECTION OF BLADDER TUMOR  2010     VASCULAR SURGERY      varicose vein stripping      Family History   Problem Relation Age of Onset     Anesthesia problems Neg Hx       Social History     Socioeconomic History     Marital status: Single     Spouse name: Not on file     Number of children: Not on file     Years of education: Not on file     Highest education level: Not on file   Occupational History     Not on file   Social Needs     Financial resource strain: Not on file     Food insecurity     Worry: Not on file     Inability: Not on file     Transportation needs     Medical: Not on file     Non-medical: Not on file   Tobacco Use     Smoking status: Former Smoker     Quit date: 8/3/1980     Years since quittin.2     Smokeless tobacco: Never Used   Substance and Sexual Activity     Alcohol use: No     Drug use: No     Sexual activity: Not on file   Lifestyle     Physical activity     Days per week: Not on file     Minutes per session: Not on file     Stress: Not on file   Relationships     Social connections     Talks on phone: Not on file     Gets together: Not on file     Attends Confucianism service: Not on file     Active member of club or organization: Not on file     Attends meetings of clubs or  "organizations: Not on file     Relationship status: Not on file     Intimate partner violence     Fear of current or ex partner: Not on file     Emotionally abused: Not on file     Physically abused: Not on file     Forced sexual activity: Not on file   Other Topics Concern     Not on file   Social History Narrative     Not on file      Current Outpatient Medications   Medication Sig Dispense Refill     atorvastatin (LIPITOR) 20 MG tablet Take 1 tablet (20 mg total) by mouth every morning. 90 tablet 3     cyanocobalamin 1,000 mcg/mL injection Inject 1,000 mcg into the shoulder, thigh, or buttocks every 30 (thirty) days.        gabapentin (NEURONTIN) 300 MG capsule Take 1,200 mg by mouth at bedtime.        lisinopril-hydrochlorothiazide (ZESTORETIC) 20-12.5 mg per tablet Take 1 tablet by mouth daily. 90 tablet 3     metFORMIN (GLUCOPHAGE) 500 MG tablet Take 1,000 mg by mouth 2 (two) times a day with meals.       pen needle, diabetic 31 gauge x 3/16\" Ndle Use once daily with Tresiba       rOPINIRole (REQUIP) 1 MG tablet Take 7 mg by mouth at bedtime.        sitaGLIPtin (JANUVIA) 100 MG tablet Take 100 mg by mouth daily.       TRESIBA FLEXTOUCH U-200 200 unit/mL (3 mL) InPn Inject 40 Units as directed at bedtime.       No current facility-administered medications for this visit.       Objective:   Vital Signs:   Visit Vitals  /62 (Patient Site: Right Arm, Patient Position: Sitting, Cuff Size: Adult Regular)   Pulse (!) 58   Temp 97.8  F (36.6  C)   Ht 5' 9.5\" (1.765 m)   Wt 213 lb (96.6 kg)   SpO2 97%   BMI 31.00 kg/m           VisionScreening:  No exam data present     PHYSICAL EXAM  Pleasant gentleman in no distress.  Vital signs noted.  HEENT is unremarkable.  He is wearing a mask.  Neck supple.  Lungs clear.  Heart regular.  Abdomen obese soft nontender.  Extremities left edema.  He has no open sores.  He does have diminished monofilament sensation in his feet bilaterally.    Assessment Results 10/12/2020 "   Activities of Daily Living No help needed   Instrumental Activities of Daily Living No help needed   Get Up and Go Score Less than 12 seconds   Some recent data might be hidden     A Mini-Cog score of 0-2 suggests the possibility of dementia, score of 3-5 suggests no dementia        Identified Health Risks:     The patient was provided with suggestions to help him develop a healthy physical lifestyle.   The patient was counseled and encouraged to consider modifying their diet and eating habits. He was provided with information on recommended healthy diet options.  Patient's advanced directive was discussed and I am comfortable with the patient's wishes.

## 2021-06-12 NOTE — TELEPHONE ENCOUNTER
Question following Office Visit  When did you see your provider: 10/12/2020  What is your question: Patient stated he received a call from   [facility] attempting to schedule an appointment. Patient was under the impression records were going to be obtained from [facility] and reviewed.  Okay to leave a detailed message: Yes

## 2021-06-12 NOTE — TELEPHONE ENCOUNTER
Patient notified that we are waiting for Dr Meng to review colonoscopy from [facility]. Once this is done, we will inform patient. He will hold off on scheduling until he hears back from us.    Genia Plascencia, CMA

## 2021-06-14 NOTE — TELEPHONE ENCOUNTER
Refill Approved    Rx renewed per Medication Renewal Policy. Medication was last renewed on 1/16/20.    Jeannette Berry, [facility] Triage/Med Refill 1/25/2021     Requested Prescriptions   Pending Prescriptions Disp Refills     atorvastatin (LIPITOR) 20 MG tablet [Pharmacy Med Name: ATORVASTATIN 20 MG TABLET] 90 tablet 3     Sig: TAKE 1 TABLET BY MOUTH EVERY DAY IN THE MORNING       Statins Refill Protocol (Hmg CoA Reductase Inhibitors) Passed - 1/24/2021  9:48 AM        Passed - PCP or prescribing provider visit in past 12 months      Last office visit with prescriber/PCP: 1/16/2020 Heraclio Meng MD OR same dept: 9/3/2020 Aleta Tomlin MD OR same specialty: 9/3/2020 Aleta Tomlin MD  Last physical: 10/12/2020 Last MTM visit: Visit date not found   Next visit within 3 mo: Visit date not found  Next physical within 3 mo: Visit date not found  Prescriber OR PCP: Heraclio Meng MD  Last diagnosis associated with med order: 1. Essential hypertension  - atorvastatin (LIPITOR) 20 MG tablet [Pharmacy Med Name: ATORVASTATIN 20 MG TABLET]; TAKE 1 TABLET BY MOUTH EVERY DAY IN THE MORNING  Dispense: 90 tablet; Refill: 3    2. Type 2 diabetes mellitus without complication, with long-term current use of insulin (H)  - atorvastatin (LIPITOR) 20 MG tablet [Pharmacy Med Name: ATORVASTATIN 20 MG TABLET]; TAKE 1 TABLET BY MOUTH EVERY DAY IN THE MORNING  Dispense: 90 tablet; Refill: 3    If protocol passes may refill for 12 months if within 3 months of last provider visit (or a total of 15 months).

## 2021-06-16 PROBLEM — E78.5 HYPERLIPIDEMIA: Status: ACTIVE | Noted: 2018-06-12

## 2021-06-16 PROBLEM — G62.9 PERIPHERAL NEUROPATHY: Status: ACTIVE | Noted: 2020-10-12

## 2021-06-16 PROBLEM — N52.9 MALE ERECTILE DISORDER: Status: ACTIVE | Noted: 2020-01-16

## 2021-06-18 NOTE — PATIENT INSTRUCTIONS - HE
Patient Instructions by Heraclio Meng MD at 10/12/2020  7:20 AM     Author: Heraclio Meng MD Service: -- Author Type: Physician    Filed: 10/12/2020  8:03 AM Encounter Date: 10/12/2020 Status: Signed    : Heraclio Meng MD (Physician)         Patient Education     Your Health Risk Assessment indicates you feel you are not in good physical health.    A healthy lifestyle helps keep the body fit and the mind alert. It helps protect you from disease, helps you fight disease, and helps prevent chronic disease (disease that doesn't go away) from getting worse. This is important as you get older and begin to notice twinges in muscles and joints and a decline in the strength and stamina you once took for granted. A healthy lifestyle includes good healthcare, good nutrition, weight control, recreation, and regular exercise. Avoid harmful substances and do what you can to keep safe. Another part of a healthy lifestyle is stay mentally active and socially involved.    Good healthcare     Have a wellness visit every year.     If you have new symptoms, let us know right away. Don't wait until the next checkup.     Take medicines exactly as prescribed and keep your medicines in a safe place. Tell us if your medicine causes problems.   Healthy diet and weight control     Eat 3 or 4 small, nutritious, low-fat, high-fiber meals a day. Include a variety of fruits, vegetables, and whole-grain foods.     Make sure you get enough calcium in your diet. Calcium, vitamin D, and exercise help prevent osteoporosis (bone thinning).     If you live alone, try eating with others when you can. That way you get a good meal and have company while you eat it.     Try to keep a healthy weight. If you eat more calories than your body uses for energy, it will be stored as fat and you will gain weight.     Recreation   Recreation is not limited to sports and team events. It includes any activity that provides relaxation, interest, enjoyment,  and exercise. Recreation provides an outlet for physical, mental, and social energy. It can give a sense of worth and achievement. It can help you stay healthy.       Patient Education   Understanding USDA MyPlate  The USDA (US Department of Agriculture) has guidelines to help you make healthy food choices. These are called MyPlate. MyPlate shows the food groups that make up healthy meals using the image of a place setting. Before you eat, think about the healthiest choices for what to put onto your plate or into your cup or bowl. To learn more about building a healthy plate, visit www.choosemyplate.gov.       The Food Groups    Fruits: Any fruit or 100% fruit juice counts as part of the Fruit Group. Fruits may be fresh, canned, frozen, or dried, and may be whole, cut-up, or pureed. Make half your plate fruits and vegetables.    Vegetables: Any vegetable or 100% vegetable juice counts as a member of the Vegetable Group. Vegetables may be fresh, frozen, canned, or dried. They can be served raw or cooked and may be whole, cut-up, or mashed. Make half your plate fruits and vegetables.     Grains: All foods made from grains are part of the Grains Group. These include wheat, rice, oats, cornmeal, and barley such as bread, pasta, oatmeal, cereal, tortillas, and grits. Grains should be no more than a quarter of your plate. At least half of your grains should be whole grains.    Protein: This group includes meat, poultry, seafood, beans and peas, eggs, processed soy products (like tofu), nuts (including nut butters), and seeds. Make protein choices no more than a quarter of your plate. Meat and poultry choices should be lean or low fat.    Dairy: All fluid milk products and foods made from milk that contain calcium, like yogurt and cheese are part of the Dairy Group. (Foods that have little calcium, such as cream, butter, and cream cheese, are not part of the group.) Most dairy choices should be low-fat or  fat-free.    Oils: These are fats that are liquid at room temperature. They include canola, corn, olive, soybean, and sunflower oil. Foods that are mainly oil include mayonnaise, certain salad dressings, and soft margarines. You should have only 5 to 7 teaspoons of oils a day. You probably already get this much from the food you eat.  Use Blood cell Storagecker to Help Build Your Meals  The SuperTracker can help you plan and track your meals and activity. You can look up individual foods to see or compare their nutritional value. You can get guidelines for what and how much you should eat. You can compare your food choices. And you can assess personal physical activities and see ways you can improve. Go to www.Stalkthis.gov/Exabloxcker/.    2276-2276 [employer]. [address] [place] [address]. All rights reserved. This information is not intended as a substitute for professional medical care. Always follow your healthcare professional's instructions.             Advance Directive  Patients advance directive was discussed and I am comfortable with the patients wishes.  Patient Education   Personalized Prevention Plan  You are due for the preventive services outlined below.  Your care team is available to assist you in scheduling these services.  If you have already completed any of these items, please share that information with your care team to update in your medical record.  Health Maintenance   Topic Date Due   ? HEPATITIS C SCREENING  1950   ? DIABETIC FOOT EXAM  1950   ? COLORECTAL CANCER SCREENING  10/11/1967   ? HEPATITIS B VACCINES (1 of 3 - Risk 3-dose series) 10/11/1968   ? A1C  12/24/2019   ? INFLUENZA VACCINE RULE BASED (1) 08/01/2020   ? BMP  09/04/2020   ? LIPID  09/24/2020   ? DIABETIC EYE EXAM  11/11/2020   ? MEDICARE ANNUAL WELLNESS VISIT  10/12/2021   ? FALL RISK ASSESSMENT  10/12/2021   ? ADVANCE CARE PLANNING  07/27/2025   ? TD 18+ HE  01/31/2030   ? Pneumococcal  Vaccine: 65+ Years  Completed   ? ZOSTER VACCINES  Completed   ? Pneumococcal Vaccine: Pediatrics (0 to 5 Years) and At-Risk Patients (6 to 64 Years)  Aged Out   ? MICROALBUMIN  Discontinued

## 2021-06-21 NOTE — LETTER
Letter by Heraclio Meng MD at      Author: Heraclio Meng MD Service: -- Author Type: --    Filed:  Encounter Date: 10/20/2020 Status: (Other)         Efren Wynne  [address] [facility] [address]             October 20, 2020         Dear Mr. Wynne,    Below are the results from your recent visit:    Resulted Orders   Lipid Cascade   Result Value Ref Range    Cholesterol 103 <=199 mg/dL    Triglycerides 52 <=149 mg/dL    HDL Cholesterol 51 >=40 mg/dL    LDL Calculated 42 <=129 mg/dL    Patient Fasting > 8hrs? Yes    Comprehensive Metabolic Panel   Result Value Ref Range    Sodium 138 136 - 145 mmol/L    Potassium 4.2 3.5 - 5.0 mmol/L    Chloride 102 98 - 107 mmol/L    CO2 28 22 - 31 mmol/L    Anion Gap, Calculation 8 5 - 18 mmol/L    Glucose 118 70 - 125 mg/dL    BUN 12 8 - 28 mg/dL    Creatinine 0.80 0.70 - 1.30 mg/dL    GFR MDRD Af Amer >60 >60 mL/min/1.73m2    GFR MDRD Non Af Amer >60 >60 mL/min/1.73m2    Bilirubin, Total 0.8 0.0 - 1.0 mg/dL    Calcium 9.0 8.5 - 10.5 mg/dL    Protein, Total 6.8 6.0 - 8.0 g/dL    Albumin 4.0 3.5 - 5.0 g/dL    Alkaline Phosphatase 104 45 - 120 U/L    AST 21 0 - 40 U/L    ALT 18 0 - 45 U/L    Narrative    Fasting Glucose reference range is 70-99 mg/dL per  American Diabetes Association (ADA) guidelines.   Urinalysis-UC if Indicated   Result Value Ref Range    Color, UA Yellow Colorless, Yellow, Straw, Light Yellow    Clarity, UA Clear Clear    Glucose, UA Negative Negative    Bilirubin, UA Negative Negative    Ketones, UA Negative Negative    Specific Gravity, UA 1.015 1.005 - 1.030    Blood, UA Negative Negative    pH, UA 5.5 5.0 - 8.0    Protein, UA Negative Negative mg/dL    Urobilinogen, UA 0.2 E.U./dL 0.2 E.U./dL, 1.0 E.U./dL    Nitrite, UA Negative Negative    Leukocytes, UA Negative Negative    Narrative    Microscopic not indicated  UC not indicated   PSA, Annual Screen (Prostatic-Specific Antigen)   Result Value Ref Range    PSA 1.5 0.0 - 6.5 ng/mL     Narrative    Method is Abbott Prostate-Specific Antigen (PSA)  Standard-WHO 1st International (90:10)   HM2(CBC w/o Differential)   Result Value Ref Range    WBC 5.9 4.0 - 11.0 thou/uL    RBC 5.02 4.40 - 6.20 mill/uL    Hemoglobin 14.2 14.0 - 18.0 g/dL    Hematocrit 42.2 40.0 - 54.0 %    MCV 84 80 - 100 fL    MCH 28.2 27.0 - 34.0 pg    MCHC 33.6 32.0 - 36.0 g/dL    RDW 12.9 11.0 - 14.5 %    Platelets 186 140 - 440 thou/uL    MPV 7.3 7.0 - 10.0 fL   Vitamin B12   Result Value Ref Range    Vitamin B-12 438 213 - 816 pg/mL       Your labs look excellent!     Please call with questions or contact us using [email or web address]    Sincerely,        Electronically signed by Heraclio Meng MD

## 2021-07-30 ENCOUNTER — TRANSFERRED RECORDS (OUTPATIENT)
Dept: HEALTH INFORMATION MANAGEMENT | Facility: CLINIC | Age: 71
End: 2021-07-30
Payer: COMMERCIAL

## 2021-07-30 LAB — RETINOPATHY: POSITIVE

## 2021-08-24 ENCOUNTER — TRANSFERRED RECORDS (OUTPATIENT)
Dept: HEALTH INFORMATION MANAGEMENT | Facility: CLINIC | Age: 71
End: 2021-08-24

## 2021-08-29 ENCOUNTER — HEALTH MAINTENANCE LETTER (OUTPATIENT)
Age: 71
End: 2021-08-29

## 2021-09-27 ENCOUNTER — TELEPHONE (OUTPATIENT)
Dept: INTERNAL MEDICINE | Facility: CLINIC | Age: 71
End: 2021-09-27

## 2021-09-27 NOTE — TELEPHONE ENCOUNTER
Reason for Call: patient is calling to request a hard copy RX for B12.  cyanocobalamin (VITAMIN B12) 1000 MCG/ML injection  Would like it mailed to him, as he is in [place], [place]  Is going to try to get it online.  Sent to: [address] [place] [address]., [address] [facility] [address]     Detailed comments: please call patient back when done.     Phone Number Patient can be reached at: Home number on file [phone] (home)    Best Time: any    Can we leave a detailed message on this number? YES    Call taken on 9/27/2021 at 3:28 PM by Zeny Rushing

## 2021-09-28 NOTE — TELEPHONE ENCOUNTER
I called patient he reports that he actually doesn't need the printed Rx anymore. His endocrinologist is actually going to order this for patient. Offer to schedule however patient reports  will return call to make follow up appt when his schedule permits.

## 2021-10-24 ENCOUNTER — HEALTH MAINTENANCE LETTER (OUTPATIENT)
Age: 71
End: 2021-10-24

## 2021-10-26 DIAGNOSIS — E78.5 HYPERLIPIDEMIA, UNSPECIFIED HYPERLIPIDEMIA TYPE: ICD-10-CM

## 2021-10-26 DIAGNOSIS — Z79.4 TYPE 2 DIABETES MELLITUS WITHOUT COMPLICATION, WITH LONG-TERM CURRENT USE OF INSULIN (H): Primary | ICD-10-CM

## 2021-10-26 DIAGNOSIS — E11.9 TYPE 2 DIABETES MELLITUS WITHOUT COMPLICATION, WITH LONG-TERM CURRENT USE OF INSULIN (H): Primary | ICD-10-CM

## 2021-10-26 RX ORDER — ATORVASTATIN CALCIUM 20 MG/1
20 TABLET, FILM COATED ORAL DAILY
Qty: 30 TABLET | Refills: 1 | OUTPATIENT
Start: 2021-10-26

## 2021-10-26 NOTE — TELEPHONE ENCOUNTER
I have not seen patient in over a year and he has multiple medical problems.  He needs an appointment.  Perhaps he has changed physicians?

## 2021-11-17 ENCOUNTER — OFFICE VISIT (OUTPATIENT)
Dept: INTERNAL MEDICINE | Facility: CLINIC | Age: 71
End: 2021-11-17
Payer: COMMERCIAL

## 2021-11-17 VITALS
DIASTOLIC BLOOD PRESSURE: 70 MMHG | OXYGEN SATURATION: 97 % | WEIGHT: 238 LBS | SYSTOLIC BLOOD PRESSURE: 136 MMHG | HEIGHT: 70 IN | BODY MASS INDEX: 34.07 KG/M2 | HEART RATE: 60 BPM

## 2021-11-17 DIAGNOSIS — Z11.3 ROUTINE SCREENING FOR STI (SEXUALLY TRANSMITTED INFECTION): ICD-10-CM

## 2021-11-17 DIAGNOSIS — G63 POLYNEUROPATHY ASSOCIATED WITH UNDERLYING DISEASE (H): ICD-10-CM

## 2021-11-17 DIAGNOSIS — E53.8 B12 DEFICIENCY: ICD-10-CM

## 2021-11-17 DIAGNOSIS — Z85.51 HISTORY OF BLADDER CANCER: ICD-10-CM

## 2021-11-17 DIAGNOSIS — I10 ESSENTIAL HYPERTENSION: ICD-10-CM

## 2021-11-17 DIAGNOSIS — Z98.84 HISTORY OF GASTRIC BYPASS: ICD-10-CM

## 2021-11-17 DIAGNOSIS — Z11.59 NEED FOR HEPATITIS C SCREENING TEST: ICD-10-CM

## 2021-11-17 DIAGNOSIS — E78.5 DYSLIPIDEMIA: ICD-10-CM

## 2021-11-17 DIAGNOSIS — E11.9 TYPE 2 DIABETES MELLITUS WITHOUT COMPLICATION, WITH LONG-TERM CURRENT USE OF INSULIN (H): Primary | ICD-10-CM

## 2021-11-17 DIAGNOSIS — Z79.4 TYPE 2 DIABETES MELLITUS WITHOUT COMPLICATION, WITH LONG-TERM CURRENT USE OF INSULIN (H): Primary | ICD-10-CM

## 2021-11-17 LAB — HIV 1+2 AB+HIV1 P24 AG SERPL QL IA: NEGATIVE

## 2021-11-17 PROCEDURE — 86780 TREPONEMA PALLIDUM: CPT | Performed by: INTERNAL MEDICINE

## 2021-11-17 PROCEDURE — 36415 COLL VENOUS BLD VENIPUNCTURE: CPT | Performed by: INTERNAL MEDICINE

## 2021-11-17 PROCEDURE — 86803 HEPATITIS C AB TEST: CPT | Performed by: INTERNAL MEDICINE

## 2021-11-17 PROCEDURE — 87389 HIV-1 AG W/HIV-1&-2 AB AG IA: CPT | Performed by: INTERNAL MEDICINE

## 2021-11-17 PROCEDURE — 99214 OFFICE O/P EST MOD 30 MIN: CPT | Performed by: INTERNAL MEDICINE

## 2021-11-17 RX ORDER — INSULIN DEGLUDEC 200 U/ML
40 INJECTION, SOLUTION SUBCUTANEOUS DAILY
COMMUNITY
Start: 2021-01-13 | End: 2022-09-28

## 2021-11-17 RX ORDER — LISINOPRIL AND HYDROCHLOROTHIAZIDE 12.5; 2 MG/1; MG/1
TABLET ORAL
COMMUNITY
Start: 2020-01-16 | End: 2022-09-28

## 2021-11-17 ASSESSMENT — MIFFLIN-ST. JEOR: SCORE: 1827.87

## 2021-11-17 NOTE — PROGRESS NOTES
Office Visit - Follow Up   Efren Wynne   71 year old male    Date of Visit: 11/17/2021    Chief Complaint   Patient presents with     Diabetes     med fu - fasting         Assessment and Plan   1. Type 2 diabetes mellitus without complication, with long-term current use of insulin (H)  Sugars uncontrolled.  He kind of treats himself with his insulin.  He treats with Tresiba based upon what he had the day before to eat.  I told him that this is counterintuitive.  He might benefit from continuous glucose monitor as well as mealtime insulin.  He is good to bring this up with his endocrinologist.    2. History of bladder cancer  He gets yearly cystoscopies.    3. Essential hypertension  Blood pressure fair today.  Continue regimen.    4. Dyslipidemia  Recent lipids are very low.  Continue regimen.    5. History of gastric bypass  He is down because he has gained some weight back.  May benefit from some counseling.  We will see him back in a few weeks and see how his mood is doing.  Low threshold to start medications.  He has taken Prozac in the past    6. B12 deficiency  Continue IM B12.    7. Polyneuropathy associated with underlying disease (H)  He tells me this is stable.    8. Routine screening for STI (sexually transmitted infection)  Discussed STI prevention today.  - Treponema Abs w Reflex to RPR and Titer; Future  - HIV Antigen Antibody Combo; Future  - Treponema Abs w Reflex to RPR and Titer  - HIV Antigen Antibody Combo    9. Need for hepatitis C screening test    - Hepatitis C Screen Reflex to HCV RNA Quant and Genotype; Future  - Hepatitis C Screen Reflex to HCV RNA Quant and Genotype        Return in about 3 weeks (around 12/8/2021) for Follow up, with me, using a video visit.     History of Present Illness   This 71 year old Efren Wynne comes in today for follow-up have not seen him in well over a year.  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  Lives part-time in [place].  He  "has diabetes and a eating disorder with morbid obesity.  He was able to lose a lot of weight with gastric bypass but has gained some of that back.  He is discouraged and down today.  He denies chest pains or shortness of breath.  He has not had Covid.  He would like to get screened for STI.  No anal intercourse.  Oral intercourse only.  Sugars have been poorly controlled.    Review of Systems: A comprehensive review of systems was negative except as noted.     Medications, Allergies and Problem List   Reviewed, reconciled and updated  Post Discharge Medication Reconciliation Status:      Physical Exam   General Appearance:   Pleasant gentleman who is occasionally tearful.  Weight is up.  Further exam deferred.    /70 (BP Location: Left arm, Patient Position: Sitting, Cuff Size: Adult Small)   Pulse 60   Ht 1.765 m (5' 9.5\")   Wt 108 kg (238 lb)   SpO2 97%   BMI 34.64 kg/m           Additional Information   Current Outpatient Medications   Medication Sig Dispense Refill     ATORVASTATIN CALCIUM PO Take 20 mg by mouth daily        cyanocobalamin (VITAMIN B12) 1000 MCG/ML injection Inject 1 mL into the muscle every 30 days       GABAPENTIN PO Take 1,200 mg by mouth daily 300 mg capsule       insulin degludec (TRESIBA FLEXTOUCH) 200 UNIT/ML pen Inject 40 Units Subcutaneous daily       lisinopril-hydrochlorothiazide (ZESTORETIC) 20-12.5 MG tablet lisinopril 20 mg-hydrochlorothiazide 12.5 mg tablet   TAKE 1 TABLET BY MOUTH EVERY DAY       metFORMIN (GLUCOPHAGE) 500 MG tablet Take 1,000 mg by mouth 2 times daily (with meals)       ROPINIROLE HCL PO Take 7 mg by mouth daily        sitagliptin (JANUVIA) 100 MG tablet Take 100 mg by mouth daily       Allergies   Allergen Reactions     Iodide Hives     Social History     Tobacco Use     Smoking status: Former Smoker     Quit date: 8/3/1980     Years since quittin.3     Smokeless tobacco: Never Used   Substance Use Topics     Alcohol use: No     Drug use: No "       Review and/or order of clinical lab tests:  Review and/or order of radiology tests:  Review and/or order of medicine tests:  Discussion of test results with performing physician:  Decision to obtain old records and/or obtain history from someone other than the patient:  Review and summarization of old records and/or obtaining history from someone other than the patient and.or discussion of case with another health care provider:  Independent visualization of image, tracing or specimen itself:    Time:      SILVIANO JAVIER MD

## 2021-11-17 NOTE — LETTER
November 18, 2021      Efren Wynne  [address] [facility] [address]        Dear ,    We are writing to inform you of your test results.    STI testing is all normal.  Good news      Resulted Orders   Hepatitis C Screen Reflex to HCV RNA Quant and Genotype   Result Value Ref Range    Hepatitis C Antibody Nonreactive Nonreactive    Narrative    Assay performance characteristics have not been established for newborns, infants, and children.   Treponema Abs w Reflex to RPR and Titer   Result Value Ref Range    Treponema Antibody Total Nonreactive Nonreactive   HIV Antigen Antibody Combo   Result Value Ref Range    HIV Antigen Antibody Combo Negative Negative       If you have any questions or concerns, please call the clinic at the number listed above.       Sincerely,      Heraclio Meng MD

## 2021-11-18 LAB
HCV AB SERPL QL IA: NONREACTIVE
T PALLIDUM AB SER QL: NONREACTIVE

## 2021-11-18 ASSESSMENT — PATIENT HEALTH QUESTIONNAIRE - PHQ9: SUM OF ALL RESPONSES TO PHQ QUESTIONS 1-9: 6

## 2021-12-19 ENCOUNTER — HEALTH MAINTENANCE LETTER (OUTPATIENT)
Age: 71
End: 2021-12-19

## 2021-12-21 ENCOUNTER — VIRTUAL VISIT (OUTPATIENT)
Dept: INTERNAL MEDICINE | Facility: CLINIC | Age: 71
End: 2021-12-21
Payer: COMMERCIAL

## 2021-12-21 DIAGNOSIS — R21 RASH: Primary | ICD-10-CM

## 2021-12-21 DIAGNOSIS — F43.21 REACTION, ADJUSTMENT, WITH DEPRESSED MOOD, BRIEF: ICD-10-CM

## 2021-12-21 PROCEDURE — 99213 OFFICE O/P EST LOW 20 MIN: CPT | Mod: 95 | Performed by: INTERNAL MEDICINE

## 2021-12-21 ASSESSMENT — PATIENT HEALTH QUESTIONNAIRE - PHQ9: SUM OF ALL RESPONSES TO PHQ QUESTIONS 1-9: 3

## 2021-12-21 NOTE — Clinical Note
Please contact patient to schedule an annual wellness visit next year.  Next summer would be good.  Whenever works for him.

## 2021-12-21 NOTE — PROGRESS NOTES
Efren is a 71 year old who is being evaluated via a billable video visit.      How would you like to obtain your AVS? MyChart  If the video visit is dropped, the invitation should be resent by: Send to e-mail at: [email or web address]  Will anyone else be joining your video visit? No      Video Start Time: 1712    Office Visit - Follow Up   Efren Wynne   71 year old male    Date of Visit: 12/21/2021    Chief Complaint   Patient presents with     Follow Up     Raquel Video: [phone] - pt reports his mood is well/stable (phq9 today is 3)         Assessment and Plan   1. Rash  Unclear as to the etiology.  He states he gets it all over his body and they are more like hives.  If it recurs and he wants me to look at it he will make an appointment again.    2. Reaction, adjustment, with depressed mood, brief  He seems to be doing better at this time.  He will let me know if things change.  Otherwise I will see him back next year as planned.        Return in about 6 months (around 6/21/2022) for Routine preventive, with me, in person.     History of Present Illness   This 71 year old patient joins me for follow-up.  At our last visit he was somewhat down in the dum because he gained some weight.  Today he joins me and he tells me he is feeling a lot better.  PHQ-9 is 3.  He is in better spirits.  He is getting back into his swimming when he is living down in [place].  He is here in the [facility] for the holidays currently.  He had recurrent of that rash but it is on the buttock area now.  No blisters or pustules.  He tells me he has had this issue in the past over various areas of his body he has been given a cream to utilize.  He is not been told he has herpes in the past.  Again there is no blistery lesions.    Review of Systems: A comprehensive review of systems was negative except as noted.     Medications, Allergies and Problem List   Reviewed, reconciled and updated  Post Discharge Medication Reconciliation Status:       Physical Exam   General Appearance:   Pleasant gentleman looks well on video.  PHQ 9 is noted.    There were no vitals taken for this visit.         Additional Information   Current Outpatient Medications   Medication Sig Dispense Refill     ATORVASTATIN CALCIUM PO Take 20 mg by mouth daily        cyanocobalamin (VITAMIN B12) 1000 MCG/ML injection Inject 1 mL into the muscle every 30 days       GABAPENTIN PO Take 1,200 mg by mouth daily 300 mg capsule       insulin degludec (TRESIBA FLEXTOUCH) 200 UNIT/ML pen Inject 40 Units Subcutaneous daily       lisinopril-hydrochlorothiazide (ZESTORETIC) 20-12.5 MG tablet lisinopril 20 mg-hydrochlorothiazide 12.5 mg tablet   TAKE 1 TABLET BY MOUTH EVERY DAY       metFORMIN (GLUCOPHAGE) 500 MG tablet Take 1,000 mg by mouth 2 times daily (with meals)       ROPINIROLE HCL PO Take 7 mg by mouth daily        sitagliptin (JANUVIA) 100 MG tablet Take 100 mg by mouth daily       Allergies   Allergen Reactions     Iodide Hives     Social History     Tobacco Use     Smoking status: Former Smoker     Quit date: 8/3/1980     Years since quittin.4     Smokeless tobacco: Never Used   Substance Use Topics     Alcohol use: No     Drug use: No       Review and/or order of clinical lab tests:  Review and/or order of radiology tests:  Review and/or order of medicine tests:  Discussion of test results with performing physician:  Decision to obtain old records and/or obtain history from someone other than the patient:  Review and summarization of old records and/or obtaining history from someone other than the patient and.or discussion of case with another health care provider:  Independent visualization of image, tracing or specimen itself:    Time:      SILVIANO JAVIER MD  Video-Visit Details    Type of service:  Video Visit    Video End Time:    Originating Location (pt. Location): Home    Distant Location (provider location):  [facility]  used for Video Visit: Raul

## 2022-01-12 ENCOUNTER — OFFICE VISIT (OUTPATIENT)
Dept: INTERNAL MEDICINE | Facility: CLINIC | Age: 72
End: 2022-01-12
Payer: COMMERCIAL

## 2022-01-12 VITALS
HEIGHT: 70 IN | HEART RATE: 68 BPM | SYSTOLIC BLOOD PRESSURE: 122 MMHG | TEMPERATURE: 97.8 F | BODY MASS INDEX: 34.5 KG/M2 | OXYGEN SATURATION: 98 % | WEIGHT: 241 LBS | DIASTOLIC BLOOD PRESSURE: 62 MMHG

## 2022-01-12 DIAGNOSIS — B35.4 RINGWORM, BODY: Primary | ICD-10-CM

## 2022-01-12 DIAGNOSIS — M79.671 PAIN OF RIGHT HEEL: ICD-10-CM

## 2022-01-12 PROCEDURE — 99213 OFFICE O/P EST LOW 20 MIN: CPT | Performed by: INTERNAL MEDICINE

## 2022-01-12 RX ORDER — CLOTRIMAZOLE 1 %
CREAM (GRAM) TOPICAL 2 TIMES DAILY
Qty: 30 G | Refills: 1 | Status: SHIPPED | OUTPATIENT
Start: 2022-01-12 | End: 2022-01-24

## 2022-01-12 ASSESSMENT — MIFFLIN-ST. JEOR: SCORE: 1841.48

## 2022-01-12 NOTE — PROGRESS NOTES
"  Office Visit - Follow Up   Efren Wynne   71 year old male    Date of Visit: 1/12/2022    Chief Complaint   Patient presents with     Bleeding/Bruising     Rt heel bruising x 4 days (bruises is gone now) - no pain at this time      Hives     ongoing buttock rash/hives  - using otc steriod cream (unsure of name)          Assessment and Plan   1. Ringworm, body  I have recommended clotrimazole cream twice daily for at least 2 weeks or until clear.  We discussed the nature of these and the recurrent nature of them.  - clotrimazole (LOTRIMIN) 1 % external cream; Apply topically 2 times daily  Dispense: 30 g; Refill: 1    2. Pain of right heel  I do not see a foreign body in there.  We will keep an eye on things and if this persists he will be referred to podiatry.        Return in about 3 months (around 4/12/2022) for Next scheduled visit, Routine preventive, with me.     History of Present Illness   This 71 year old Basim comes in today for evaluation of right heel pain.  It is getting better and some ecchymosis that was there has resolved.  He does wonder if he sees something in his heel.  He also notes that he has a rash on his buttocks that he wants me to look at.    Review of Systems: A comprehensive review of systems was negative except as noted.     Medications, Allergies and Problem List   Reviewed, reconciled and updated  Post Discharge Medication Reconciliation Status:      Physical Exam   General Appearance:   Pleasant gentleman.  I do not see any ecchymosis of the heel.  He points to a lesion that looks a little darker and is tender but I do not feel a foreign body and there and I do not see any sliver etc.  He has a 10-year-old rash on his buttock.    /62 (BP Location: Left arm, Patient Position: Sitting, Cuff Size: Adult Small)   Pulse 68   Temp 97.8  F (36.6  C)   Ht 1.765 m (5' 9.5\")   Wt 109.3 kg (241 lb)   SpO2 98%   BMI 35.08 kg/m           Additional Information   Current Outpatient " Medications   Medication Sig Dispense Refill     ATORVASTATIN CALCIUM PO Take 20 mg by mouth daily        clotrimazole (LOTRIMIN) 1 % external cream Apply topically 2 times daily 30 g 1     cyanocobalamin (VITAMIN B12) 1000 MCG/ML injection Inject 1 mL into the muscle every 30 days       GABAPENTIN PO Take 1,200 mg by mouth daily 300 mg capsule       insulin degludec (TRESIBA FLEXTOUCH) 200 UNIT/ML pen Inject 40 Units Subcutaneous daily       lisinopril-hydrochlorothiazide (ZESTORETIC) 20-12.5 MG tablet lisinopril 20 mg-hydrochlorothiazide 12.5 mg tablet   TAKE 1 TABLET BY MOUTH EVERY DAY       metFORMIN (GLUCOPHAGE) 500 MG tablet Take 1,000 mg by mouth 2 times daily (with meals)       ROPINIROLE HCL PO Take 7 mg by mouth daily        sitagliptin (JANUVIA) 100 MG tablet Take 100 mg by mouth daily       Allergies   Allergen Reactions     Iodide Hives     Social History     Tobacco Use     Smoking status: Former Smoker     Quit date: 8/3/1980     Years since quittin.4     Smokeless tobacco: Never Used   Substance Use Topics     Alcohol use: No     Drug use: No       Review and/or order of clinical lab tests:  Review and/or order of radiology tests:  Review and/or order of medicine tests:  Discussion of test results with performing physician:  Decision to obtain old records and/or obtain history from someone other than the patient:  Review and summarization of old records and/or obtaining history from someone other than the patient and.or discussion of case with another health care provider:  Independent visualization of image, tracing or specimen itself:    Time:      SILVIANO JAVIER MD

## 2022-01-24 ENCOUNTER — MYC MEDICAL ADVICE (OUTPATIENT)
Dept: INTERNAL MEDICINE | Facility: CLINIC | Age: 72
End: 2022-01-24
Payer: COMMERCIAL

## 2022-01-24 DIAGNOSIS — B35.4 RINGWORM, BODY: ICD-10-CM

## 2022-01-24 RX ORDER — CLOTRIMAZOLE 1 %
CREAM (GRAM) TOPICAL 2 TIMES DAILY
Qty: 30 G | Refills: 1 | Status: SHIPPED | OUTPATIENT
Start: 2022-01-24 | End: 2022-05-09

## 2022-04-08 ENCOUNTER — IMMUNIZATION (OUTPATIENT)
Dept: NURSING | Facility: CLINIC | Age: 72
End: 2022-04-08
Payer: COMMERCIAL

## 2022-04-08 PROCEDURE — 0064A COVID-19,PF,MODERNA (18+ YRS BOOSTER .25ML): CPT

## 2022-04-08 PROCEDURE — 91306 COVID-19,PF,MODERNA (18+ YRS BOOSTER .25ML): CPT

## 2022-05-09 ENCOUNTER — OFFICE VISIT (OUTPATIENT)
Dept: INTERNAL MEDICINE | Facility: CLINIC | Age: 72
End: 2022-05-09
Payer: COMMERCIAL

## 2022-05-09 VITALS
TEMPERATURE: 97.9 F | HEIGHT: 70 IN | BODY MASS INDEX: 33.21 KG/M2 | OXYGEN SATURATION: 97 % | WEIGHT: 232 LBS | HEART RATE: 62 BPM | DIASTOLIC BLOOD PRESSURE: 72 MMHG | SYSTOLIC BLOOD PRESSURE: 121 MMHG

## 2022-05-09 DIAGNOSIS — Z00.00 ENCOUNTER FOR MEDICARE ANNUAL WELLNESS EXAM: Primary | ICD-10-CM

## 2022-05-09 DIAGNOSIS — E11.319 TYPE 2 DIABETES MELLITUS WITH RETINOPATHY, WITH LONG-TERM CURRENT USE OF INSULIN, MACULAR EDEMA PRESENCE UNSPECIFIED, UNSPECIFIED LATERALITY, UNSPECIFIED RETINOPATHY SEVERITY (H): ICD-10-CM

## 2022-05-09 DIAGNOSIS — E11.3299 NONPROLIFERATIVE DIABETIC RETINOPATHY (H): ICD-10-CM

## 2022-05-09 DIAGNOSIS — E78.5 DYSLIPIDEMIA: ICD-10-CM

## 2022-05-09 DIAGNOSIS — E78.2 MIXED HYPERLIPIDEMIA: ICD-10-CM

## 2022-05-09 DIAGNOSIS — G63 POLYNEUROPATHY ASSOCIATED WITH UNDERLYING DISEASE (H): ICD-10-CM

## 2022-05-09 DIAGNOSIS — E53.8 B12 DEFICIENCY: ICD-10-CM

## 2022-05-09 DIAGNOSIS — I10 ESSENTIAL HYPERTENSION: ICD-10-CM

## 2022-05-09 DIAGNOSIS — Z98.84 HISTORY OF GASTRIC BYPASS: ICD-10-CM

## 2022-05-09 DIAGNOSIS — Z79.4 TYPE 2 DIABETES MELLITUS WITH RETINOPATHY, WITH LONG-TERM CURRENT USE OF INSULIN, MACULAR EDEMA PRESENCE UNSPECIFIED, UNSPECIFIED LATERALITY, UNSPECIFIED RETINOPATHY SEVERITY (H): ICD-10-CM

## 2022-05-09 DIAGNOSIS — Z85.51 HISTORY OF BLADDER CANCER: ICD-10-CM

## 2022-05-09 DIAGNOSIS — Z11.3 ROUTINE SCREENING FOR STI (SEXUALLY TRANSMITTED INFECTION): ICD-10-CM

## 2022-05-09 DIAGNOSIS — Z12.5 SCREENING FOR PROSTATE CANCER: ICD-10-CM

## 2022-05-09 LAB
ALBUMIN SERPL-MCNC: 4 G/DL (ref 3.5–5)
ALP SERPL-CCNC: 106 U/L (ref 45–120)
ALT SERPL W P-5'-P-CCNC: 18 U/L (ref 0–45)
ANION GAP SERPL CALCULATED.3IONS-SCNC: 11 MMOL/L (ref 5–18)
AST SERPL W P-5'-P-CCNC: 23 U/L (ref 0–40)
BILIRUB SERPL-MCNC: 1 MG/DL (ref 0–1)
BUN SERPL-MCNC: 13 MG/DL (ref 8–28)
CALCIUM SERPL-MCNC: 9.2 MG/DL (ref 8.5–10.5)
CHLORIDE BLD-SCNC: 102 MMOL/L (ref 98–107)
CHOLEST SERPL-MCNC: 94 MG/DL
CO2 SERPL-SCNC: 25 MMOL/L (ref 22–31)
CREAT SERPL-MCNC: 1.01 MG/DL (ref 0.7–1.3)
FASTING STATUS PATIENT QL REPORTED: YES
GFR SERPL CREATININE-BSD FRML MDRD: 79 ML/MIN/1.73M2
GLUCOSE BLD-MCNC: 77 MG/DL (ref 70–125)
HBA1C MFR BLD: 7.7 % (ref 0–5.6)
HDLC SERPL-MCNC: 45 MG/DL
HIV 1+2 AB+HIV1 P24 AG SERPL QL IA: NEGATIVE
LDLC SERPL CALC-MCNC: 38 MG/DL
POTASSIUM BLD-SCNC: 4.1 MMOL/L (ref 3.5–5)
PROT SERPL-MCNC: 7.1 G/DL (ref 6–8)
PSA SERPL-MCNC: 1.76 UG/L (ref 0–6.5)
SODIUM SERPL-SCNC: 138 MMOL/L (ref 136–145)
TRIGL SERPL-MCNC: 53 MG/DL
VIT B12 SERPL-MCNC: 1254 PG/ML (ref 213–816)

## 2022-05-09 PROCEDURE — 80061 LIPID PANEL: CPT | Performed by: INTERNAL MEDICINE

## 2022-05-09 PROCEDURE — 87389 HIV-1 AG W/HIV-1&-2 AB AG IA: CPT | Performed by: INTERNAL MEDICINE

## 2022-05-09 PROCEDURE — 36415 COLL VENOUS BLD VENIPUNCTURE: CPT | Performed by: INTERNAL MEDICINE

## 2022-05-09 PROCEDURE — 99397 PER PM REEVAL EST PAT 65+ YR: CPT | Performed by: INTERNAL MEDICINE

## 2022-05-09 PROCEDURE — 80053 COMPREHEN METABOLIC PANEL: CPT | Performed by: INTERNAL MEDICINE

## 2022-05-09 PROCEDURE — 86780 TREPONEMA PALLIDUM: CPT | Performed by: INTERNAL MEDICINE

## 2022-05-09 PROCEDURE — 99214 OFFICE O/P EST MOD 30 MIN: CPT | Mod: 25 | Performed by: INTERNAL MEDICINE

## 2022-05-09 PROCEDURE — 82607 VITAMIN B-12: CPT | Performed by: INTERNAL MEDICINE

## 2022-05-09 PROCEDURE — 87491 CHLMYD TRACH DNA AMP PROBE: CPT | Performed by: INTERNAL MEDICINE

## 2022-05-09 PROCEDURE — G0103 PSA SCREENING: HCPCS | Performed by: INTERNAL MEDICINE

## 2022-05-09 PROCEDURE — 87591 N.GONORRHOEAE DNA AMP PROB: CPT | Performed by: INTERNAL MEDICINE

## 2022-05-09 PROCEDURE — 83036 HEMOGLOBIN GLYCOSYLATED A1C: CPT | Performed by: INTERNAL MEDICINE

## 2022-05-09 RX ORDER — DULAGLUTIDE 1.5 MG/.5ML
1.5 INJECTION, SOLUTION SUBCUTANEOUS WEEKLY
COMMUNITY
Start: 2022-05-06 | End: 2023-08-17

## 2022-05-09 RX ORDER — CHOLECALCIFEROL (VITAMIN D3) 50 MCG
50 TABLET ORAL EVERY MORNING
COMMUNITY

## 2022-05-09 ASSESSMENT — PATIENT HEALTH QUESTIONNAIRE - PHQ9: SUM OF ALL RESPONSES TO PHQ QUESTIONS 1-9: 3

## 2022-05-09 ASSESSMENT — ACTIVITIES OF DAILY LIVING (ADL): CURRENT_FUNCTION: NO ASSISTANCE NEEDED

## 2022-05-09 NOTE — LETTER
May 10, 2022      Basim Wynne  [address] [address]        Dear ,    We are writing to inform you of your test results.    Basim, your labs here look excellent.         Resulted Orders   NEISSERIA GONORRHOEA PCR   Result Value Ref Range    Neisseria gonorrhoeae Negative Negative      Comment:      Negative for N. gonorrhoeae rRNA by transcription mediated amplification. A negative result by transcription mediated amplification does not preclude the presence of C. trachomatis infection because results are dependent on proper and adequate collection, absence of inhibitors and sufficient rRNA to be detected.   CHLAMYDIA TRACHOMATIS PCR   Result Value Ref Range    Chlamydia trachomatis Negative Negative      Comment:      A negative result by transcription mediated amplification does not preclude the presence of C. trachomatis infection because results are dependent on proper and adequate collection, absence of inhibitors and sufficient rRNA to be detected.   Lipid panel reflex to direct LDL Fasting   Result Value Ref Range    Cholesterol 94 <=199 mg/dL    Triglycerides 53 <=149 mg/dL    Direct Measure HDL 45 >=40 mg/dL      Comment:      HDL Cholesterol Reference Range:     0-2 years:   No reference ranges established for patients under 2 years old  at [facility] for lipid analytes.    2-8 years:  Greater than 45 mg/dL     18 years and older:   Female: Greater than or equal to 50 mg/dL   Male:   Greater than or equal to 40 mg/dL    LDL Cholesterol Calculated 38 <=129 mg/dL    Patient Fasting > 8hrs? Yes    HEMOGLOBIN A1C   Result Value Ref Range    Hemoglobin A1C 7.7 (H) 0.0 - 5.6 %      Comment:      Normal <5.7%   Prediabetes 5.7-6.4%    Diabetes 6.5% or higher     Note: Adopted from ADA consensus guidelines.   PSA, screen   Result Value Ref Range    Prostate Specific Antigen Screen 1.76 0.00 - 6.50 ug/L    Narrative    Assay Method is Abbott Prostate-Specific Antigen  (PSA)  Standard-WHO 1st International (90:10)   Comprehensive metabolic panel (BMP + Alb, Alk Phos, ALT, AST, Total. Bili, TP)   Result Value Ref Range    Sodium 138 136 - 145 mmol/L    Potassium 4.1 3.5 - 5.0 mmol/L    Chloride 102 98 - 107 mmol/L    Carbon Dioxide (CO2) 25 22 - 31 mmol/L    Anion Gap 11 5 - 18 mmol/L    Urea Nitrogen 13 8 - 28 mg/dL    Creatinine 1.01 0.70 - 1.30 mg/dL    Calcium 9.2 8.5 - 10.5 mg/dL    Glucose 77 70 - 125 mg/dL    Alkaline Phosphatase 106 45 - 120 U/L    AST 23 0 - 40 U/L    ALT 18 0 - 45 U/L    Protein Total 7.1 6.0 - 8.0 g/dL    Albumin 4.0 3.5 - 5.0 g/dL    Bilirubin Total 1.0 0.0 - 1.0 mg/dL    GFR Estimate 79 >60 mL/min/1.73m2      Comment:      Effective December 21, 2021 eGFRcr in adults is calculated using the 2021 CKD-EPI creatinine equation which includes age and gender (Emily et al., NEJ, DOI: 10.1056/QGBNhb5110316)   HIV Antigen Antibody Combo   Result Value Ref Range    HIV Antigen Antibody Combo Negative Negative   Treponema Abs w Reflex to RPR and Titer   Result Value Ref Range    Treponema Antibody Total Nonreactive Nonreactive   Vitamin B12   Result Value Ref Range    Vitamin B12 1,254 (H) 213 - 816 pg/mL       If you have any questions or concerns, please call the clinic at the number listed above.       Sincerely,      Heraclio Meng MD

## 2022-05-09 NOTE — PROGRESS NOTES
"SUBJECTIVE:   Efren Wynne is a 71 year old male who presents for Preventive Visit.    Basim comes in today for his annual wellness.  He continues to struggle with his eating disorder.  Has secondary diabetes which he established with a new endocrinologist recently and that did not go well so he is going to see someone different.  He does not know what his sugars are doing.  At times they are good and other times or not but depending on what he is doing.  He has been under the treatment of a eating disorder clinic in the past.  He does not think he is depressed.  He is not interested in mood medications at this time.  He does note that his mood is not great however.  He is looking forward to going to [place] soon.  Actually he is leaving today.  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    He has 1 brother whom he is not very close with.  Apparently in [place].  Patient has been advised of split billing requirements and indicates understanding: Yes  Are you in the first 12 months of your Medicare coverage?  No    Healthy Habits:     In general, how would you rate your overall health?  Fair    Frequency of exercise:  None    Do you usually eat at least 4 servings of fruit and vegetables a day, include whole grains    & fiber and avoid regularly eating high fat or \"junk\" foods?  No    Taking medications regularly:  Yes    Medication side effects:  Not applicable    Ability to successfully perform activities of daily living:  No assistance needed    Home Safety:  Throw rugs in the hallway    Hearing Impairment:  No hearing concerns    In the past 6 months, have you been bothered by leaking of urine?  No    In general, how would you rate your overall mental or emotional health?  Fair      PHQ-2 Total Score: 2    Additional concerns today:  No    Do you feel safe in your environment? Yes    Have you ever done Advance Care Planning? (For example, a Health Directive, POLST, or a discussion with a medical provider " or your loved ones about your wishes): Yes, patient states has an Advance Care Planning document and will bring a copy to the clinic.       Fall risk  Fallen 2 or more times in the past year?: Yes  Any fall with injury in the past year?: No    Cognitive Screening   1) Repeat 3 items (Leader, Season, Table)    2) Clock draw: NORMAL  3) 3 item recall: Recalls 3 objects  Results: NORMAL clock    Mini-CogTM Copyright MEERA Padilla. Licensed by the author for use in [facility]; reprinted with permission (gonzález@[facility]). All rights reserved.      Do you have sleep apnea, excessive snoring or daytime drowsiness?: No    Reviewed and updated as needed this visit by clinical staff   Tobacco  Allergies  Meds                Reviewed and updated as needed this visit by Provider                   Social History     Tobacco Use     Smoking status: Former Smoker     Quit date: 8/3/1980     Years since quittin.7     Smokeless tobacco: Never Used   Substance Use Topics     Alcohol use: No     If you drink alcohol do you typically have >3 drinks per day or >7 drinks per week? No    Alcohol Use 2022   Prescreen: >3 drinks/day or >7 drinks/week? Not Applicable       Current providers sharing in care for this patient include:   Patient Care Team:  Heraclio Meng MD as PCP - General (Internal Medicine)  Nestor Candelario MD as MD (Internal Medicine)  Camden Hyde MD as MD (Neurology)  Merrick Rodriguez MD as MD (Urology)  Heraclio Meng MD as Assigned PCP    The following health maintenance items are reviewed in Epic and correct as of today:  Health Maintenance Due   Topic Date Due     AORTIC ANEURYSM SCREENING (SYSTEM ASSIGNED)  Never done     MEDICARE ANNUAL WELLNESS VISIT  10/12/2021     BMP  10/12/2021     LIPID  10/12/2021     A1C  2022               Review of Systems   ROS: 10 point ROS neg other than the symptoms noted above in the HPI.    OBJECTIVE:   /72 (BP Location: Left arm, Patient  "Position: Sitting, Cuff Size: Adult Regular)   Pulse 62   Temp 97.9  F (36.6  C)   Ht 1.765 m (5' 9.5\")   Wt 105.2 kg (232 lb)   SpO2 97%   BMI 33.77 kg/m   Estimated body mass index is 33.77 kg/m  as calculated from the following:    Height as of this encounter: 1.765 m (5' 9.5\").    Weight as of this encounter: 105.2 kg (232 lb).  Physical Exam  GENERAL: healthy, alert and no distress  NECK: no adenopathy, no asymmetry, masses, or scars and thyroid normal to palpation  RESP: lungs clear to auscultation - no rales, rhonchi or wheezes  CV: regular rate and rhythm, normal S1 S2, no S3 or S4, no murmur, click or rub, no peripheral edema and peripheral pulses strong  ABDOMEN: soft, nontender, no hepatosplenomegaly, no masses and bowel sounds normal  MS: no gross musculoskeletal defects noted, no edema  SKIN: no suspicious lesions or rashes  PSYCH: mentation appears normal, affect normal/bright  Diabetic foot exam: normal DP and PT pulses, no trophic changes or ulcerative lesions and reduced sensation at toes bilaterally especially the great toes.        ASSESSMENT / PLAN:   1. Encounter for Medicare annual wellness exam  He is up-to-date on his immunizations.  We will screen for AAA.  - [place] Aorta Medicare AAA Screening; Future    2. Type 2 diabetes mellitus with retinopathy, with long-term current use of insulin, macular edema presence unspecified, unspecified laterality, unspecified retinopathy severity (H)  Unknown control.  Hopefully he will find an endocrinologist that he cooks with.  - HEMOGLOBIN A1C; Future  - Comprehensive metabolic panel (BMP + Alb, Alk Phos, ALT, AST, Total. Bili, TP); Future  - HEMOGLOBIN A1C  - Comprehensive metabolic panel (BMP + Alb, Alk Phos, ALT, AST, Total. Bili, TP)    3. B12 deficiency  Check B12 today.  - Vitamin B12; Future  - Vitamin B12    4. Dyslipidemia  Lipids today on his statin.  - Lipid panel reflex to direct LDL Fasting; Future  - Lipid panel reflex to direct LDL " "Fasting    5. Essential hypertension  Excellent blood pressure control.  Continue same.    6. History of bladder cancer  Sees Dr. Rodriguez yearly.    7. Mixed hyperlipidemia      8. History of gastric bypass  Weight is down considerably from his highest of 330+    9. Polyneuropathy associated with underlying disease (H)  Continued efforts at good sugar control urged.    10. Nonproliferative diabetic retinopathy (H)  I do not have recent eye exams but will try to get those.    11. Routine screening for STI (sexually transmitted infection)  We will screen for STI per his request.  Fairly low risk behaviors with oral intercourse only.  - HIV Antigen Antibody Combo; Future  - Treponema Abs w Reflex to RPR and Titer; Future  - NEISSERIA GONORRHOEA PCR  - CHLAMYDIA TRACHOMATIS PCR  - HIV Antigen Antibody Combo  - Treponema Abs w Reflex to RPR and Titer    12. Screening for prostate cancer    - PSA, screen; Future  - PSA, screen    Patient has been advised of split billing requirements and indicates understanding: Yes    COUNSELING:  Reviewed preventive health counseling, as reflected in patient instructions       Consider AAA screening for ages 65-75 and smoking history    Estimated body mass index is 33.77 kg/m  as calculated from the following:    Height as of this encounter: 1.765 m (5' 9.5\").    Weight as of this encounter: 105.2 kg (232 lb).    Weight management plan: Patient has not been successful in eating disorder clinics in the past.    He reports that he quit smoking about 41 years ago. He has never used smokeless tobacco.      Appropriate preventive services were discussed with this patient, including applicable screening as appropriate for cardiovascular disease, diabetes, osteopenia/osteoporosis, and glaucoma.  As appropriate for age/gender, discussed screening for colorectal cancer, prostate cancer, breast cancer, and cervical cancer. Checklist reviewing preventive services available has been given to the " patient.    Reviewed patients plan of care and provided an AVS. The Complex Care Plan (for patients with higher acuity and needing more deliberate coordination of services) for Efren meets the Care Plan requirement. This Care Plan has been established and reviewed with the Patient.    Counseling Resources:  ATP IV Guidelines  Pooled Cohorts Equation Calculator  Breast Cancer Risk Calculator  Breast Cancer: Medication to Reduce Risk  FRAX Risk Assessment  ICSI Preventive Guidelines  Dietary Guidelines for Americans, 2010  [employer]'s MyPlate  ASA Prophylaxis  Lung CA Screening    SILVIANO JAVIER MD  [facility]    Identified Health Risks:    The patient was provided with suggestions to help him develop a healthy physical lifestyle.  He is at risk for lack of exercise and has been provided with information to increase physical activity for the benefit of his well-being.  The patient was counseled and encouraged to consider modifying their diet and eating habits. He was provided with information on recommended healthy diet options.  The patient was provided with suggestions to help him develop a healthy emotional lifestyle.  He is at risk for falling and has been provided with information to reduce the risk of falling at home.

## 2022-05-09 NOTE — PATIENT INSTRUCTIONS
Patient Education   Personalized Prevention Plan  You are due for the preventive services outlined below.  Your care team is available to assist you in scheduling these services.  If you have already completed any of these items, please share that information with your care team to update in your medical record.  Health Maintenance Due   Topic Date Due     AORTIC ANEURYSM SCREENING (SYSTEM ASSIGNED)  Never done     Basic Metabolic Panel  10/12/2021     Cholesterol Lab  10/12/2021     A1C Lab  05/03/2022     Your Health Risk Assessment indicates you feel you are not in good health    A healthy lifestyle helps keep the body fit and the mind alert. It helps protect you from disease, helps you fight disease, and helps prevent chronic disease (disease that doesn't go away) from getting worse. This is important as you get older and begin to notice twinges in muscles and joints and a decline in the strength and stamina you once took for granted. A healthy lifestyle includes good healthcare, good nutrition, weight control, recreation, and regular exercise. Avoid harmful substances and do what you can to keep safe. Another part of a healthy lifestyle is stay mentally active and socially involved.    Good healthcare     Have a wellness visit every year.     If you have new symptoms, let us know right away. Don't wait until the next checkup.     Take medicines exactly as prescribed and keep your medicines in a safe place. Tell us if your medicine causes problems.   Healthy diet and weight control     Eat 3 or 4 small, nutritious, low-fat, high-fiber meals a day. Include a variety of fruits, vegetables, and whole-grain foods.     Make sure you get enough calcium in your diet. Calcium, vitamin D, and exercise help prevent osteoporosis (bone thinning).     If you live alone, try eating with others when you can. That way you get a good meal and have company while you eat it.     Try to keep a healthy weight. If you eat more  calories than your body uses for energy, it will be stored as fat and you will gain weight.     Recreation   Recreation is not limited to sports and team events. It includes any activity that provides relaxation, interest, enjoyment, and exercise. Recreation provides an outlet for physical, mental, and social energy. It can give a sense of worth and achievement. It can help you stay healthy.    Mental Exercise and Social Involvement  Mental and emotional health is as important as physical health. Keep in touch with friends and family. Stay as active as possible. Continue to learn and challenge yourself.   Things you can do to stay mentally active are:    Learn something new, like a foreign language or musical instrument.     Play SCRABBLE or do crossword puzzles. If you cannot find people to play these games with you at home, you can play them with others on your computer through the Internet.     Join a games club--anything from card games to chess or checkers or lawn bowling.     Start a new hobby.     Go back to school.     Volunteer.     Read.   Keep up with world events.    Exercise for a Healthier Heart  You may wonder how you can improve the health of your heart. If you re thinking about exercise, you re on the right track. You don t need to become an athlete. But you do need a certain amount of brisk exercise to help strengthen your heart. If you have been diagnosed with a heart condition, your healthcare provider may advise exercise to help stabilize your condition. To help make exercise a habit, choose safe, fun activities.      Exercise with a friend. When activity is fun, you're more likely to stick with it.   Before you start  Check with your healthcare provider before starting an exercise program. This is especially important if you have not been active for a while. It's also important if you have a long-term (chronic) health problem such as heart disease, diabetes, or obesity. Or if you are at high  risk for having these problems.   Why exercise?  Exercising regularly offers many healthy rewards. It can help you do all of the following:     Improve your blood cholesterol level to help prevent further heart trouble    Lower your blood pressure to help prevent a stroke or heart attack    Control diabetes, or reduce your risk of getting this disease    Improve your heart and lung function    Reach and stay at a healthy weight    Make your muscles stronger so you can stay active    Prevent falls and fractures by slowing the loss of bone mass (osteoporosis)    Manage stress better    Reduce your blood pressure    Improve your sense of self and your body image  Exercise tips      Ease into your routine. Set small goals. Then build on them. If you are not sure what your activity level should be, talk with your healthcare provider first before starting an exercise routine.    Exercise on most days. Aim for a total of 150 minutes (2 hours and 30 minutes) or more of moderate-intensity aerobic activity each week. Or 75 minutes (1 hour and 15 minutes) or more of vigorous-intensity aerobic activity each week. Or try for a combination of both. Moderate activity means that you breathe heavier and your heart rate increases but you can still talk. Think about doing 40 minutes of moderate exercise, 3 to 4 times a week. For best results, activity should last for about 40 minutes to lower blood pressure and cholesterol. It's OK to work up to the 40-minute period over time. Examples of moderate-intensity activity are walking 1 mile in 15 minutes. Or doing 30 to 45 minutes of yard work.    Step up your daily activity level.  Along with your exercise program, try being more active the whole day. Walk instead of drive. Or park further away so that you take more steps each day. Do more household tasks or yard work. You may not be able to meet the advised mount of physical activity. But doing some moderate- or vigorous-intensity aerobic  activity can help reduce your risk for heart disease. Your healthcare provider can help you figure out what is best for you.    Choose 1 or more activities you enjoy.  Walking is one of the easiest things you can do. You can also try swimming, riding a bike, dancing, or taking an exercise class.    When to call your healthcare provider  Call your healthcare provider if you have any of these:     Chest pain or feel dizzy or lightheaded    Burning, tightness, pressure, or heaviness in your chest, neck, shoulders, back, or arms    Abnormal shortness of breath    More joint or muscle pain    A very fast or irregular heartbeat (palpitations)  "[employer] last reviewed this educational content on 7/1/2019 2000-2021 [employer]. All rights reserved. This information is not intended as a substitute for professional medical care. Always follow your healthcare professional's instructions.          Understanding USDA MyPlate  The USDA has guidelines to help you make healthy food choices. These are called MyPlate. MyPlate shows the food groups that make up healthy meals using the image of a place setting. Before you eat, think about the healthiest choices for what to put on your plate or in your cup or bowl. To learn more about building a healthy plate, visit www.choosemyplate.gov.    The food groups    Fruits. Any fruit or 100% fruit juice counts as part of the Fruit Group. Fruits may be fresh, canned, frozen, or dried, and may be whole, cut-up, or pureed. Make 1/2 of your plate fruits and vegetables.    Vegetables. Any vegetable or 100% vegetable juice counts as a member of the Vegetable Group. Vegetables may be fresh, frozen, canned, or dried. They can be served raw or cooked and may be whole, cut-up, or mashed. Make 1/2 of your plate fruits and vegetables.    Grains. All foods made from grains are part of the Grains Group. These include wheat, rice, oats, cornmeal, and barley. Grains are often used to make  foods such as bread, pasta, oatmeal, cereal, tortillas, and grits. Grains should be no more than 1/4 of your plate. At least half of your grains should be whole grains.    Protein. This group includes meat, poultry, seafood, beans and peas, eggs, processed soy products (such as tofu), nuts (including nut butters), and seeds. Make protein choices no more than 1/4 of your plate. Meat and poultry choices should be lean or low fat.    Dairy. The Dairy Group includes all fluid milk products and foods made from milk that contain calcium, such as yogurt and cheese. (Foods that have little calcium, such as cream, butter, and cream cheese, are not part of this group.) Most dairy choices should be low-fat or fat-free.    Oils. Oils aren't a food group, but they do contain essential nutrients. However it's important to watch your intake of oils. These are fats that are liquid at room temperature. They include canola, corn, olive, soybean, vegetable, and sunflower oil. Foods that are mainly oil include mayonnaise, certain salad dressings, and soft margarines. You likely already get your daily oil allowance from the foods you eat.  Things to limit  Eating healthy also means limiting these things in your diet:       Salt (sodium). Many processed foods have a lot of sodium. To keep sodium intake down, eat fresh vegetables, meats, poultry, and seafood when possible. Purchase low-sodium, reduced-sodium, or no-salt-added food products at the store. And don't add salt to your meals at home. Instead, season them with herbs and spices such as dill, oregano, cumin, and paprika. Or try adding flavor with lemon or lime zest and juice.    Saturated fat. Saturated fats are most often found in animal products such as beef, pork, and chicken. They are often solid at room temperature, such as butter. To reduce your saturated fat intake, choose leaner cuts of meat and poultry. And try healthier cooking methods such as grilling, broiling,  roasting, or baking. For a simple lower-fat swap, use plain nonfat yogurt instead of mayonnaise when making potato salad or macaroni salad.    Added sugars. These are sugars added to foods. They are in foods such as ice cream, candy, soda, fruit drinks, sports drinks, energy drinks, cookies, pastries, jams, and syrups. Cut down on added sugars by sharing sweet treats with a family member or friend. You can also choose fruit for dessert, and drink water or other unsweetened beverages.     [employer] last reviewed this educational content on 6/1/2020 2000-2021 [employer]. All rights reserved. This information is not intended as a substitute for professional medical care. Always follow your healthcare professional's instructions.        Your Health Risk Assessment indicates you feel you are not in good emotional health.    Recreation   Recreation is not limited to sports and team events. It includes any activity that provides relaxation, interest, enjoyment, and exercise. Recreation provides an outlet for physical, mental, and social energy. It can give a sense of worth and achievement. It can help you stay healthy.    Mental Exercise and Social Involvement  Mental and emotional health is as important as physical health. Keep in touch with friends and family. Stay as active as possible. Continue to learn and challenge yourself.   Things you can do to stay mentally active are:    Learn something new, like a foreign language or musical instrument.     Play SCRABBLE or do crossword puzzles. If you cannot find people to play these games with you at home, you can play them with others on your computer through the Internet.     Join a games club--anything from card games to chess or checkers or lawn bowling.     Start a new hobby.     Go back to school.     Volunteer.     Read.   Keep up with world events.    Preventing Falls at Home  A person can fall for many reasons. Older adults may fall because reaction  time slows down as we age. Your muscles and joints may get stiff, weak, or less flexible because of illness, medicines, or a physical condition.   Other health problems that make falls more likely include:     Arthritis    Dizziness or lightheadedness when you stand up (orthostatic hypotension)    History of a stroke    Dizziness    Anemia    Certain medicines taken for mental illness or to control blood pressure.    Problems with balance or gait    Bladder or urinary problems    History of falling    Changes in vision (vision impairment)    Changes in thinking skills and memory (cognitive impairment)  Injuries from a fall can include serious injuries such as broken bones, dislocated joints, internal bleeding and cuts. Injuries like these can limit your independence.   Prevention tips  To help prevent falls and fall-related injuries, follow the tips below.    Floors  To make floors safer:     Put nonskid pads under area rugs.    Remove small rugs.    Replace worn floor coverings.    Tack carpets firmly to each step on carpeted stairs. Put nonskid strips on the edges of uncarpeted stairs.    Keep floors and stairs free of clutter and cords.    Arrange furniture so there are clear pathways.    Clean up any spills right away.  Bathrooms    To make bathrooms safer:     Install grab bars in the tub or shower.    Apply nonskid strips or put a nonskid rubber mat in the tub or shower.    Sit on a bath chair to bathe.    Use bathmats with nonskid backing.  Lighting  To improve visibility in your home:      Keep a flashlight in each room. Or put a lamp next to the bed within easy reach.    Put nightlights in the bedrooms, hallways, kitchen, and bathrooms.    Make sure all stairways have good lighting.    Take your time when going up and down stairs.    Put handrails on both sides of stairs and in walkways for more support. To prevent injury to your wrist or arm, don t use handrails to pull yourself up.    Install grab bars to  pull yourself up.    Move or rearrange items that you use often. This will make them easier to find or reach.    Look at your home to find any safety hazards. Especially look at doorways, walkways, and the driveway. Remove or repair any safety problems that you find.  Other changes to make    Look around to find any safety hazards. Look closely at doorways, walkways, and the driveway. Remove or repair any safety problems that you find.    Wear shoes that fit well.    Take your time when going up and down stairs.    Put handrails on both sides of stairs and in walkways for more support. To prevent injury to your wrist or arm, don t use handrails to pull yourself up.    Install grab bars wherever needed to pull yourself up.    Arrange items that you use often. This will make them easier to find or reach.    [employer] last reviewed this educational content on 3/1/2020    2728-5588 [employer]. All rights reserved. This information is not intended as a substitute for professional medical care. Always follow your healthcare professional's instructions.

## 2022-05-10 LAB
C TRACH DNA SPEC QL NAA+PROBE: NEGATIVE
N GONORRHOEA DNA SPEC QL NAA+PROBE: NEGATIVE
T PALLIDUM AB SER QL: NONREACTIVE

## 2022-05-20 ENCOUNTER — MYC MEDICAL ADVICE (OUTPATIENT)
Dept: INTERNAL MEDICINE | Facility: CLINIC | Age: 72
End: 2022-05-20
Payer: COMMERCIAL

## 2022-05-23 NOTE — TELEPHONE ENCOUNTER
EBER only - Call return to Basim - he reports that he's currently feeling better today. He was just feeling a little down last week. Patient reports that if his mood continues to be poor he will arrange a video visit to discuss medications.

## 2022-06-08 ENCOUNTER — HOSPITAL ENCOUNTER (OUTPATIENT)
Dept: ULTRASOUND IMAGING | Facility: CLINIC | Age: 72
Discharge: HOME | End: 2022-06-08
Attending: INTERNAL MEDICINE | Admitting: INTERNAL MEDICINE
Payer: COMMERCIAL

## 2022-06-08 DIAGNOSIS — Z00.00 ENCOUNTER FOR MEDICARE ANNUAL WELLNESS EXAM: ICD-10-CM

## 2022-06-08 PROCEDURE — 76706 US ABDL AORTA SCREEN AAA: CPT

## 2022-06-17 DIAGNOSIS — E11.9 TYPE 2 DIABETES MELLITUS WITHOUT COMPLICATIONS (H): ICD-10-CM

## 2022-06-18 NOTE — TELEPHONE ENCOUNTER
Routing refill request to provider for review/approval because:  Drug not on the [facility] refill protocol   Medication is reported/historical    Last Written Prescription Date:    Last Fill Quantity: ,  # refills:    Last office visit provider:  5/9/22     Requested Prescriptions   Pending Prescriptions Disp Refills     gabapentin (NEURONTIN) 300 MG capsule [Pharmacy Med Name: GABAPENTIN 300 MG CAPSULE] 360 capsule      Sig: TAKE 4 CAPSULES BY MOUTH AT NIGHT       There is no refill protocol information for this order          Jeannette Berry RN 06/18/22 10:17 AM    gabapentin (NEURONTIN) 300 MG capsule     --   Sig - Route: Take 1,200 mg by mouth at bedtime.  - Oral   Class: Historical Med       gabapentin (NEURONTIN) 300 MG capsule [[id number]]  Patient-reported historical medication  Ordering date: 07/27/15 2112 Authorized by: PROVIDER, HISTORICAL   Frequency: QHS  - Until Discontinued

## 2022-06-27 RX ORDER — GABAPENTIN 300 MG/1
CAPSULE ORAL
Qty: 360 CAPSULE | Refills: 3 | Status: SHIPPED | OUTPATIENT
Start: 2022-06-27 | End: 2023-07-25

## 2022-08-12 ENCOUNTER — TRANSFERRED RECORDS (OUTPATIENT)
Dept: HEALTH INFORMATION MANAGEMENT | Facility: CLINIC | Age: 72
End: 2022-08-12

## 2022-09-28 DIAGNOSIS — Z79.4 TYPE 2 DIABETES MELLITUS WITHOUT COMPLICATION, WITH LONG-TERM CURRENT USE OF INSULIN (H): Primary | ICD-10-CM

## 2022-09-28 DIAGNOSIS — E11.9 TYPE 2 DIABETES MELLITUS WITHOUT COMPLICATION, WITH LONG-TERM CURRENT USE OF INSULIN (H): Primary | ICD-10-CM

## 2022-09-28 NOTE — TELEPHONE ENCOUNTER
There are no prescription instructions.  These need to be set up before coming to us.  Thank you so much.

## 2022-09-28 NOTE — TELEPHONE ENCOUNTER
"Routing refill request to provider for review/approval because:  Medication is reported/historical    Last Written Prescription Date:    Last Fill Quantity: ,  # refills:    Last office visit provider:  5/9/22     Requested Prescriptions   Pending Prescriptions Disp Refills     insulin degludec (TRESIBA FLEXTOUCH) 200 UNIT/ML pen 15 mL      Sig: Inject 40 Units Subcutaneous daily       Long Acting Insulin Protocol Passed - 9/28/2022  9:19 AM        Passed - Serum creatinine on file in past 12 months     Recent Labs   Lab Test 05/09/22  1438   CR 1.01       Ok to refill medication if creatinine is low          Passed - HgbA1C in past 3 or 6 months     If HgbA1C is 8 or greater, it needs to be on file within the past 3 months.  If less than 8, must be on file within the past 6 months.     Recent Labs   Lab Test 05/09/22  1438   A1C 7.7*             Passed - Medication is active on med list        Passed - Patient is age 18 or older        Passed - Recent (6 mo) or future (30 days) visit within the authorizing provider's specialty     Patient had office visit in the last 6 months or has a visit in the next 30 days with authorizing provider or within the authorizing provider's specialty.  See \"Patient Info\" tab in inbasket, or \"Choose Columns\" in Meds & Orders section of the refill encounter.               lisinopril-hydrochlorothiazide (ZESTORETIC) 20-12.5 MG tablet 90 tablet        Diuretics (Including Combos) Protocol Passed - 9/28/2022  9:19 AM        Passed - Blood pressure under 140/90 in past 12 months     BP Readings from Last 3 Encounters:   05/09/22 121/72   01/12/22 122/62   11/17/21 136/70                 Passed - Recent (12 mo) or future (30 days) visit within the authorizing provider's specialty     Patient has had an office visit with the authorizing provider or a provider within the authorizing providers department within the previous 12 mos or has a future within next 30 days. See \"Patient Info\" tab in " "inbasket, or \"Choose Columns\" in Meds & Orders section of the refill encounter.              Passed - Medication is active on med list        Passed - Patient is age 18 or older        Passed - Normal serum creatinine on file in past 12 months     Recent Labs   Lab Test 05/09/22  1438   CR 1.01              Passed - Normal serum potassium on file in past 12 months     Recent Labs   Lab Test 05/09/22  1438   POTASSIUM 4.1                    Passed - Normal serum sodium on file in past 12 months     Recent Labs   Lab Test 05/09/22  1438                ACE Inhibitors (Including Combos) Protocol Passed - 9/28/2022  9:19 AM        Passed - Blood pressure under 140/90 in past 12 months     BP Readings from Last 3 Encounters:   05/09/22 121/72   01/12/22 122/62   11/17/21 136/70                 Passed - Recent (12 mo) or future (30 days) visit within the authorizing provider's specialty     Patient has had an office visit with the authorizing provider or a provider within the authorizing providers department within the previous 12 mos or has a future within next 30 days. See \"Patient Info\" tab in inbasket, or \"Choose Columns\" in Meds & Orders section of the refill encounter.              Passed - Medication is active on med list        Passed - Patient is age 18 or older        Passed - Normal serum creatinine on file in past 12 months     Recent Labs   Lab Test 05/09/22  1438   CR 1.01       Ok to refill medication if creatinine is low          Passed - Normal serum potassium on file in past 12 months     Recent Labs   Lab Test 05/09/22  1438   POTASSIUM 4.1                  Jeannette Berry RN 09/28/22 5:14 PM  "

## 2022-09-29 RX ORDER — INSULIN DEGLUDEC 200 U/ML
40 INJECTION, SOLUTION SUBCUTANEOUS DAILY
Qty: 15 ML | Refills: 3 | Status: SHIPPED | OUTPATIENT
Start: 2022-09-29 | End: 2023-09-13

## 2022-09-29 RX ORDER — LISINOPRIL AND HYDROCHLOROTHIAZIDE 12.5; 2 MG/1; MG/1
1 TABLET ORAL DAILY
Qty: 90 TABLET | Refills: 2 | Status: SHIPPED | OUTPATIENT
Start: 2022-09-29 | End: 2022-11-21

## 2022-10-04 ENCOUNTER — IMMUNIZATION (OUTPATIENT)
Dept: NURSING | Facility: CLINIC | Age: 72
End: 2022-10-04
Payer: COMMERCIAL

## 2022-10-04 PROCEDURE — G0008 ADMIN INFLUENZA VIRUS VAC: HCPCS | Mod: 59

## 2022-10-04 PROCEDURE — 90662 IIV NO PRSV INCREASED AG IM: CPT

## 2022-10-04 PROCEDURE — 0134A COVID-19,PF,MODERNA BIVALENT: CPT

## 2022-10-04 PROCEDURE — 91313 COVID-19,PF,MODERNA BIVALENT: CPT

## 2022-10-15 ENCOUNTER — HEALTH MAINTENANCE LETTER (OUTPATIENT)
Age: 72
End: 2022-10-15

## 2022-10-17 DIAGNOSIS — E78.2 MIXED HYPERLIPIDEMIA: Primary | ICD-10-CM

## 2022-10-17 RX ORDER — ATORVASTATIN CALCIUM 20 MG/1
20 TABLET, FILM COATED ORAL DAILY
Qty: 90 TABLET | Refills: 3 | Status: SHIPPED | OUTPATIENT
Start: 2022-10-17

## 2022-10-17 RX ORDER — ATORVASTATIN CALCIUM 20 MG/1
20 TABLET, FILM COATED ORAL DAILY
Status: CANCELLED | OUTPATIENT
Start: 2022-10-17

## 2022-10-17 NOTE — TELEPHONE ENCOUNTER
Medication Question or Refill        Who is calling?: Pharmacy faxed refill    What medication are you calling about (include dose and sig)?:   atorvastatin (LIPITOR) 20 MG tablet 30 tablet 1 10/26/2021  --         Controlled Substance Agreement on file:   CSA -- Patient Level:    CSA: None found at the patient level.         Do you need a refill? Yes      Preferred Pharmacy:   [place] [address] [place] [address] [address]  Phone: [phone] Fax: [phone]        Could we send this information to you [facility] or would you prefer to receive a phone call?:   No preference

## 2022-11-03 ENCOUNTER — OFFICE VISIT (OUTPATIENT)
Dept: INTERNAL MEDICINE | Facility: CLINIC | Age: 72
End: 2022-11-03
Payer: COMMERCIAL

## 2022-11-03 VITALS
DIASTOLIC BLOOD PRESSURE: 68 MMHG | HEIGHT: 70 IN | WEIGHT: 237.8 LBS | BODY MASS INDEX: 34.04 KG/M2 | OXYGEN SATURATION: 99 % | SYSTOLIC BLOOD PRESSURE: 124 MMHG | HEART RATE: 64 BPM

## 2022-11-03 DIAGNOSIS — H93.292 AUDITORY COMPLAINTS OF LEFT EAR: Primary | ICD-10-CM

## 2022-11-03 DIAGNOSIS — G25.81 RESTLESS LEGS SYNDROME: ICD-10-CM

## 2022-11-03 DIAGNOSIS — R29.6 RECURRENT FALLS: ICD-10-CM

## 2022-11-03 DIAGNOSIS — Z79.4 TYPE 2 DIABETES MELLITUS WITH RETINOPATHY, WITH LONG-TERM CURRENT USE OF INSULIN, MACULAR EDEMA PRESENCE UNSPECIFIED, UNSPECIFIED LATERALITY, UNSPECIFIED RETINOPATHY SEVERITY (H): ICD-10-CM

## 2022-11-03 DIAGNOSIS — G63 POLYNEUROPATHY ASSOCIATED WITH UNDERLYING DISEASE (H): ICD-10-CM

## 2022-11-03 DIAGNOSIS — E11.319 TYPE 2 DIABETES MELLITUS WITH RETINOPATHY, WITH LONG-TERM CURRENT USE OF INSULIN, MACULAR EDEMA PRESENCE UNSPECIFIED, UNSPECIFIED LATERALITY, UNSPECIFIED RETINOPATHY SEVERITY (H): ICD-10-CM

## 2022-11-03 PROCEDURE — 99214 OFFICE O/P EST MOD 30 MIN: CPT | Performed by: INTERNAL MEDICINE

## 2022-11-03 NOTE — PROGRESS NOTES
1. Auditory complaints of left ear  Refer to ENT for further evaluation.  - Adult ENT  Referral; Future    2. Type 2 diabetes mellitus with retinopathy, with long-term current use of insulin, macular edema presence unspecified, unspecified laterality, unspecified retinopathy severity (H)  He will be seeing a new endocrinologist soon.  They will be doing his A1c at that time    3. Restless legs syndrome  Stable    4. Polyneuropathy associated with underlying disease (H)  I am concerned about his unsteady gait.  Refer to physical therapy.  - Physical Therapy Referral; Future    5. Recurrent falls  As above.  I have urged also use of walking sticks, cane or walker to prevent falls.  He seems resistant to that.  - Physical Therapy Referral; Future    Subjective   Basim is a 72 year old, presenting for the following health issues:  Ear Problem (Air/rumbling sound- started 10/30/Sound stopped on 11/3/22)      History of Present Illness       Reason for visit:  Left eardrum noise, rumbling, fluttering.  Symptom onset:  1-3 days ago  Symptoms include:  I continuously hear the sound.  Symptom intensity:  Mild  Symptom progression:  Staying the same  Had these symptoms before:  No  What makes it worse:  Trying to sit and just think.  What makes it better:  Some activity, motion, or TV lessens to noise.    He eats 0-1 servings of fruits and vegetables daily.He consumes 2 sweetened beverage(s) daily.He exercises with enough effort to increase his heart rate 9 or less minutes per day.  He exercises with enough effort to increase his heart rate 3 or less days per week.   He is taking medications regularly.       Basim comes today for follow-up of his multiple medical problems.  He initially made this appointment for ear problems but he tells me that is better.  He noted sound in his ear that went on for a few days.  There is a rustling sound or a whooshing sound that was not a pulsatile sound.  It was not ringing.  It is  Last Visit Date:  8-19-20  Next Visit Date:  9/29/2020    Hemoglobin A1C (%)   Date Value   08/04/2020 7.5   09/26/2019 7.4   06/24/2019 8.4 (H)             ( goal A1C is < 7)   Microalb/Crt.  Ratio (mcg/mg creat)   Date Value   12/13/2019 CANNOT BE CALCULATED     LDL Cholesterol (mg/dL)   Date Value   08/04/2020 114       (goal LDL is <100)   AST (U/L)   Date Value   08/04/2020 21     ALT (U/L)   Date Value   08/04/2020 30     BUN (mg/dL)   Date Value   08/04/2020 26 (H)     BP Readings from Last 3 Encounters:   08/19/20 100/62   08/05/20 112/67   08/04/20 104/69          (goal 120/80)        Patient Active Problem List:     Cervical spondylosis     Type II or unspecified type diabetes mellitus without mention of complication, not stated as uncontrolled     Hypertension     Abnormal auditory perception     Eustachian tube dysfunction     Chronic serous otitis media     Nasal polyps     Nasal congestion     Osteoarthritis of left knee     Osteoarthritis of right knee     DIEGO (nonalcoholic steatohepatitis)     Vitamin D deficiency     Iron deficiency anemia     Dyslipidemia     Diabetes mellitus type 2, uncontrolled (HCC)     Left hip pain     Trochanteric bursitis     Fatigue     Daytime somnolence     Snoring      ----Orelia Linear "gone now.  His sugars have been poor the control.  He meets with a new endocrinologist soon.  He continues to fall frequently.  He has neuropathy and unsteady gait.      Review of Systems         Objective    /68 (BP Location: Left arm, Patient Position: Sitting, Cuff Size: Adult Regular)   Pulse 64   Ht 1.765 m (5' 9.5\")   Wt 107.9 kg (237 lb 12.8 oz)   SpO2 99%   BMI 34.61 kg/m    Body mass index is 34.61 kg/m .  Physical Exam   Pleasant gentleman.  A little bit of wax in both ears which was removed by myself.  Ears otherwise normal.  He does seem very unsteady in his gait                    "

## 2022-11-08 ENCOUNTER — MYC MEDICAL ADVICE (OUTPATIENT)
Dept: INTERNAL MEDICINE | Facility: CLINIC | Age: 72
End: 2022-11-08

## 2022-11-08 DIAGNOSIS — E11.9 TYPE 2 DIABETES MELLITUS WITHOUT COMPLICATION, WITH LONG-TERM CURRENT USE OF INSULIN (H): ICD-10-CM

## 2022-11-08 DIAGNOSIS — Z79.4 TYPE 2 DIABETES MELLITUS WITHOUT COMPLICATION, WITH LONG-TERM CURRENT USE OF INSULIN (H): ICD-10-CM

## 2022-11-08 DIAGNOSIS — E53.8 B12 DEFICIENCY: Primary | ICD-10-CM

## 2022-11-09 ENCOUNTER — HOSPITAL ENCOUNTER (OUTPATIENT)
Dept: PHYSICAL THERAPY | Facility: REHABILITATION | Age: 72
Discharge: HOME | End: 2022-11-09
Payer: COMMERCIAL

## 2022-11-09 DIAGNOSIS — G63 POLYNEUROPATHY ASSOCIATED WITH UNDERLYING DISEASE (H): ICD-10-CM

## 2022-11-09 DIAGNOSIS — R29.6 RECURRENT FALLS: ICD-10-CM

## 2022-11-09 PROCEDURE — 97110 THERAPEUTIC EXERCISES: CPT | Mod: GP | Performed by: PHYSICAL THERAPIST

## 2022-11-09 PROCEDURE — 97161 PT EVAL LOW COMPLEX 20 MIN: CPT | Mod: GP | Performed by: PHYSICAL THERAPIST

## 2022-11-09 NOTE — PROGRESS NOTES
[facility]    OUTPATIENT PHYSICAL THERAPY ORTHOPEDIC EVALUATION  PLAN OF TREATMENT FOR OUTPATIENT REHABILITATION  (COMPLETE FOR INITIAL CLAIMS ONLY)  Patient's Last Name, First Name, M.I.  YOB: 1950  Efren Wynne    Provider s Name:  [facility]   Medical Record No.  [id number]   Start of Care Date:  11/09/22   Onset Date:  11/03/22 (Date of PT order)   Type:     _X__PT   ___OT   ___SLP Medical Diagnosis:  Polyneuropathy associated with underlying disease (H), Recurrent falls     PT Diagnosis:  recurrent falls   Visits from SOC:  1      _________________________________________________________________________________  Plan of Treatment/Functional Goals:  balance training, gait training, joint mobilization, manual therapy, neuromuscular re-education, ROM, strengthening, stretching, transfer training           Goals  Goal Identifier: HEP  Goal Description: Patient will be independent with HEP and self management of symptoms  Target Date: 02/07/23    Goal Identifier: transfers  Goal Description: Patient will perform 10 sit to stand transfers without UE in 30 seconds to demonstrate increased functional strength and decreased risk of falls  Target Date: 02/07/23    Goal Identifier: curbs  Goal Description: Patient will ascend/descend curbs independently or with modified indepdence with SEC safely without pain  Target Date: 02/07/23                                                           Therapy Frequency:  1 time/week  Predicted Duration of Therapy Intervention:  90 days    Kendall Olivera, PT                 I CERTIFY THE NEED FOR THESE SERVICES FURNISHED UNDER        THIS PLAN OF TREATMENT AND WHILE UNDER MY CARE     (Physician co-signature of this document indicates review and certification of the therapy plan).                     Certification Date From:   "11/09/22   Certification Date To:  02/07/23    Referring Provider:  Dr. Heraclio Meng    Initial Assessment        See Epic Evaluation Start of Care Date: 11/09/22 11/09/22 1000   General Information   Type of Visit Initial OP Ortho PT Evaluation   Start of Care Date 11/09/22   Referring Physician Dr. Heraclio Meng   Patient/Family Goals Statement \"walking-steps\"   Orders Evaluate and Treat   Date of Order 11/03/22   Certification Required? 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  He notes some gait instability.  He finds himself catching his toe or tripping when falling.  He expresses reluctance to use cane. He also complains of bilateral knee discomfort and difficulty with transfers, stairs and curbs   Body Part(s)   Body Part(s) Hip;Knee   Presentation and Etiology   Impairments A. Pain;D. Decreased ROM;E. Decreased flexibility;F. Decreased strength and endurance;G. Impaired balance;H. Impaired gait   Functional Limitations perform activities of daily living;perform desired leisure / sports activities   Symptom Location bilateral knees, bilateral ankles, bilateral LE   Onset date of current episode/exacerbation 11/03/22  (Date of PT order)   Chronicity Chronic   Fall Risk Screen   Fall screen completed by PT   Have you fallen 2 or more times in the past year? Yes   Have you fallen and had an injury in the past year? No   Is patient a fall risk? Yes   Fall screen comments 2 falls 8 partial falls in the last year, 0/ 30 on [employer] screen   Abuse Screen (yes response referral indicated)   Feels Unsafe at Home or Work/School no   Feels Threatened by Someone no   Does Anyone Try to Keep You From Having Contact with Others or Doing Things Outside Your Home? no   Physical Signs of Abuse Present no " "  Patient needs abuse support services and resources No   Knee Objective Findings   Gait/Locomotion significant valgus bilateral knees, near scissoring gait with the amount of valgus, increased lateral sway, some instability noted on even ground.   Balance/Proprioception (Single Leg Stance) feet together eyes open 60\" eyes closed increased postural sway 50\", tandem stance 5\"   Posture tendency to be flexed at waist.   Side (if bilateral, select both right and left) Right;Left   Right Knee Extension AROM 0   Left Knee Extension AROM 0   Right Knee Flexion AROM 100   Left Knee Flexion AROM 120   Right Knee Flexion Strength 5   Left Knee Flexion Strength 5   Right Knee Extension Strength 5   Left Knee Extension Strength 5   Right Gastrocnemius Flexibility severe   Left Gastrocnemius Flexibility severe   Hip Objective Findings   Side (if bilateral, select both right and left) Right;Left   Hip/Knee Strength Comments bilateral ankle DF 4/5   Right Hip Flexion Strength 4   Left Hip Flexion Strength 4   Right Hip Abduction Strength 4   Left Hip Abduction Strength 4   Right Hip Extension Strength 4   Left Hip Extension Strength 4   Planned Therapy Interventions   Planned Therapy Interventions balance training;gait training;joint mobilization;manual therapy;neuromuscular re-education;ROM;strengthening;stretching;transfer training   Clinical Impression   Criteria for Skilled Therapeutic Interventions Met yes, treatment indicated   PT Diagnosis recurrent falls   Influenced by the following impairments balance deficit, decreased strength, decreased flexibility, postural dysfunction   Functional limitations due to impairments difficulty with walking, standing, stairs and curbs   Clinical Presentation Stable/Uncomplicated   Clinical Decision Making (Complexity) Low complexity   Therapy Frequency 1 time/week   Predicted Duration of Therapy Intervention (days/wks) 90 days   Risk & Benefits of therapy have been explained Yes "   Patient, Family & other staff in agreement with plan of care Yes   Clinical Impression Comments Patient presents for PT evaluation with recent history of falls, decreased LE strength, flexiblity, and sensation that limits patient's balance and causes gait instability.  Patient will benefit from PT to address the above mentioned impairments to impove functional mobility and decrease risk of future falls.   ORTHO GOALS   PT Ortho Eval Goals 1;2;3   Ortho Goal 1   Goal Identifier HEP   Goal Description Patient will be independent with HEP and self management of symptoms   Target Date 02/07/23   Ortho Goal 2   Goal Identifier transfers   Goal Description Patient will perform 10 sit to stand transfers without UE in 30 seconds to demonstrate increased functional strength and decreased risk of falls   Target Date 02/07/23   Ortho Goal 3   Goal Identifier curbs   Goal Description Patient will ascend/descend curbs independently or with modified indepdence with SEC safely without pain   Target Date 02/07/23   Total Evaluation Time   PT Eval, Low Complexity Minutes (86946) 25   Therapy Certification   Certification date from 11/09/22   Certification date to 02/07/23   Medical Diagnosis Polyneuropathy associated with underlying disease (H), Recurrent falls

## 2022-11-10 ENCOUNTER — MYC MEDICAL ADVICE (OUTPATIENT)
Dept: INTERNAL MEDICINE | Facility: CLINIC | Age: 72
End: 2022-11-10

## 2022-11-13 ENCOUNTER — APPOINTMENT (OUTPATIENT)
Dept: MRI IMAGING | Facility: CLINIC | Age: 72
End: 2022-11-13
Attending: EMERGENCY MEDICINE
Payer: COMMERCIAL

## 2022-11-13 ENCOUNTER — APPOINTMENT (OUTPATIENT)
Dept: MRI IMAGING | Facility: CLINIC | Age: 72
End: 2022-11-13
Attending: NURSE PRACTITIONER
Payer: COMMERCIAL

## 2022-11-13 ENCOUNTER — MYC MEDICAL ADVICE (OUTPATIENT)
Dept: INTERNAL MEDICINE | Facility: CLINIC | Age: 72
End: 2022-11-13

## 2022-11-13 ENCOUNTER — HOSPITAL ENCOUNTER (EMERGENCY)
Facility: CLINIC | Age: 72
Discharge: HOME | End: 2022-11-13
Attending: EMERGENCY MEDICINE | Admitting: EMERGENCY MEDICINE
Payer: COMMERCIAL

## 2022-11-13 ENCOUNTER — APPOINTMENT (OUTPATIENT)
Dept: CT IMAGING | Facility: CLINIC | Age: 72
End: 2022-11-13
Attending: NURSE PRACTITIONER
Payer: COMMERCIAL

## 2022-11-13 VITALS
TEMPERATURE: 97.7 F | OXYGEN SATURATION: 100 % | RESPIRATION RATE: 30 BRPM | BODY MASS INDEX: 34.64 KG/M2 | SYSTOLIC BLOOD PRESSURE: 150 MMHG | DIASTOLIC BLOOD PRESSURE: 75 MMHG | WEIGHT: 238 LBS | HEART RATE: 53 BPM

## 2022-11-13 DIAGNOSIS — G45.9 TIA (TRANSIENT ISCHEMIC ATTACK): Primary | ICD-10-CM

## 2022-11-13 DIAGNOSIS — R42 DIZZINESS: ICD-10-CM

## 2022-11-13 LAB
ANION GAP SERPL CALCULATED.3IONS-SCNC: 12 MMOL/L (ref 5–18)
APTT PPP: 27 SECONDS (ref 22–38)
ATRIAL RATE - MUSE: 64 BPM
BASOPHILS # BLD AUTO: 0.1 10E3/UL (ref 0–0.2)
BASOPHILS NFR BLD AUTO: 1 %
BUN SERPL-MCNC: 36 MG/DL (ref 8–28)
CALCIUM SERPL-MCNC: 9.5 MG/DL (ref 8.5–10.5)
CHLORIDE BLD-SCNC: 102 MMOL/L (ref 98–107)
CO2 SERPL-SCNC: 24 MMOL/L (ref 22–31)
CREAT SERPL-MCNC: 1.18 MG/DL (ref 0.7–1.3)
DIASTOLIC BLOOD PRESSURE - MUSE: NORMAL MMHG
EOSINOPHIL # BLD AUTO: 0.1 10E3/UL (ref 0–0.7)
EOSINOPHIL NFR BLD AUTO: 2 %
ERYTHROCYTE [DISTWIDTH] IN BLOOD BY AUTOMATED COUNT: 13.9 % (ref 10–15)
GFR SERPL CREATININE-BSD FRML MDRD: 65 ML/MIN/1.73M2
GLUCOSE BLD-MCNC: 139 MG/DL (ref 70–125)
GLUCOSE BLDC GLUCOMTR-MCNC: 136 MG/DL (ref 70–99)
HCT VFR BLD AUTO: 41.2 % (ref 40–53)
HGB BLD-MCNC: 13.4 G/DL (ref 13.3–17.7)
IMM GRANULOCYTES # BLD: 0 10E3/UL
IMM GRANULOCYTES NFR BLD: 0 %
INR PPP: 1.03 (ref 0.85–1.15)
INTERPRETATION ECG - MUSE: NORMAL
LYMPHOCYTES # BLD AUTO: 2.9 10E3/UL (ref 0.8–5.3)
LYMPHOCYTES NFR BLD AUTO: 35 %
MCH RBC QN AUTO: 26.5 PG (ref 26.5–33)
MCHC RBC AUTO-ENTMCNC: 32.5 G/DL (ref 31.5–36.5)
MCV RBC AUTO: 82 FL (ref 78–100)
MONOCYTES # BLD AUTO: 0.6 10E3/UL (ref 0–1.3)
MONOCYTES NFR BLD AUTO: 8 %
NEUTROPHILS # BLD AUTO: 4.6 10E3/UL (ref 1.6–8.3)
NEUTROPHILS NFR BLD AUTO: 54 %
NRBC # BLD AUTO: 0 10E3/UL
NRBC BLD AUTO-RTO: 0 /100
P AXIS - MUSE: 14 DEGREES
PLATELET # BLD AUTO: 232 10E3/UL (ref 150–450)
POTASSIUM BLD-SCNC: 3.9 MMOL/L (ref 3.5–5)
PR INTERVAL - MUSE: 188 MS
QRS DURATION - MUSE: 150 MS
QT - MUSE: 424 MS
QTC - MUSE: 437 MS
R AXIS - MUSE: -46 DEGREES
RBC # BLD AUTO: 5.05 10E6/UL (ref 4.4–5.9)
SODIUM SERPL-SCNC: 138 MMOL/L (ref 136–145)
SYSTOLIC BLOOD PRESSURE - MUSE: NORMAL MMHG
T AXIS - MUSE: -1 DEGREES
TROPONIN I SERPL-MCNC: 0.02 NG/ML (ref 0–0.29)
VENTRICULAR RATE- MUSE: 64 BPM
WBC # BLD AUTO: 8.3 10E3/UL (ref 4–11)

## 2022-11-13 PROCEDURE — 255N000002 HC RX 255 OP 636: Performed by: EMERGENCY MEDICINE

## 2022-11-13 PROCEDURE — 85730 THROMBOPLASTIN TIME PARTIAL: CPT | Performed by: EMERGENCY MEDICINE

## 2022-11-13 PROCEDURE — 85610 PROTHROMBIN TIME: CPT | Performed by: EMERGENCY MEDICINE

## 2022-11-13 PROCEDURE — 85025 COMPLETE CBC W/AUTO DIFF WBC: CPT | Performed by: EMERGENCY MEDICINE

## 2022-11-13 PROCEDURE — 70549 MR ANGIOGRAPH NECK W/O&W/DYE: CPT

## 2022-11-13 PROCEDURE — 36415 COLL VENOUS BLD VENIPUNCTURE: CPT | Performed by: EMERGENCY MEDICINE

## 2022-11-13 PROCEDURE — 70450 CT HEAD/BRAIN W/O DYE: CPT

## 2022-11-13 PROCEDURE — 70544 MR ANGIOGRAPHY HEAD W/O DYE: CPT

## 2022-11-13 PROCEDURE — A9585 GADOBUTROL INJECTION: HCPCS | Performed by: EMERGENCY MEDICINE

## 2022-11-13 PROCEDURE — 93005 ELECTROCARDIOGRAM TRACING: CPT | Performed by: EMERGENCY MEDICINE

## 2022-11-13 PROCEDURE — 84484 ASSAY OF TROPONIN QUANT: CPT | Performed by: EMERGENCY MEDICINE

## 2022-11-13 PROCEDURE — 70553 MRI BRAIN STEM W/O & W/DYE: CPT

## 2022-11-13 PROCEDURE — 99207 PR NO BILLABLE SERVICE THIS VISIT: CPT | Performed by: NURSE PRACTITIONER

## 2022-11-13 PROCEDURE — 99285 EMERGENCY DEPT VISIT HI MDM: CPT | Mod: 25

## 2022-11-13 PROCEDURE — 80048 BASIC METABOLIC PNL TOTAL CA: CPT | Performed by: EMERGENCY MEDICINE

## 2022-11-13 RX ORDER — GADOBUTROL 604.72 MG/ML
10 INJECTION INTRAVENOUS ONCE
Status: COMPLETED | OUTPATIENT
Start: 2022-11-13 | End: 2022-11-13

## 2022-11-13 RX ORDER — ROPINIROLE 1 MG/1
7-8 TABLET, FILM COATED ORAL AT BEDTIME
COMMUNITY

## 2022-11-13 RX ADMIN — GADOBUTROL 10 ML: 604.72 INJECTION INTRAVENOUS at 10:26

## 2022-11-13 ASSESSMENT — ENCOUNTER SYMPTOMS
NECK PAIN: 0
SHORTNESS OF BREATH: 0
ABDOMINAL PAIN: 0
BACK PAIN: 0
VOMITING: 0
SPEECH DIFFICULTY: 0
NAUSEA: 0
CHILLS: 0
WEAKNESS: 0
CHEST TIGHTNESS: 0
DIZZINESS: 1
FEVER: 0
NUMBNESS: 0
COUGH: 0
NECK STIFFNESS: 0
HEADACHES: 0

## 2022-11-13 ASSESSMENT — ACTIVITIES OF DAILY LIVING (ADL)
ADLS_ACUITY_SCORE: 35
ADLS_ACUITY_SCORE: 35

## 2022-11-13 NOTE — ED NOTES
MRI called to inform them of stat MRI needed for Tier 1 stroke code called by Dr Sanchez. Pt cannot have CT contrast.

## 2022-11-13 NOTE — TELEPHONE ENCOUNTER
Please contact pt and see how he is doing, and get results from ENT/imaging for me to review.  Thanks!!!    The patient is a 75y Male complaining of weakness.

## 2022-11-13 NOTE — ED TRIAGE NOTES
Pt presents to the ED with c/o of a dizzy spell that lasted about 2 to 3 minutes this am at about 0730 today. Pt has hx of DMII on insulin and hypertension. Pt denies changes to vision, headaches, speech and right or left weakness. .  Dr. Sanchez at triage for assessment of stroke like sx. Pt is allergic to contrast. MRI checklist completed.

## 2022-11-13 NOTE — CONSULTS
[facility], [place]    Stroke Telephone Note    I was called by Kameron Sanchez on 11/13/22 regarding patient Efren Wynne. The patient is a 72 year old male with PMH of DM2 and HTN. He has also recently been seen by ENT for evaluation of L tinnitus and has history of polyneuropathy documented to contribute to unsteady gait and recurrent falls. Was also seen in ED 11/10 for suicidal ideation related to the tinnitus. He presents to the ED today for disequilibrium. LKW 0730 [place] which [place] and had acute onset disequilibrium and unsteadiness; no associated fall or other symptoms. Per ED provider on exam patient remains able to ambulate without significant difficulty, has some unsteadiness with Romberg but no focal neurological deficits.    Patient is allergic to CT contrast. STAT head CT obtained to rule out bleed, followed by hyperacute MRI and MRA head/neck for further evaluation of intracranial contents and vasculature.     Stroke Code Data (for stroke code without tele)  Stroke code activated 11/13/22   0845   Stroke provider first response  11/13/22   0847            Last known normal 11/13/22   0730        Time of discovery   (or onset of symptoms) 11/13/22   0730   Head CT read by Stroke Neuro Dr/Provider 11/13/22   0915   Was stroke code de-escalated?   11/13/22            Imaging Findings   CT head: no hemorrhage, ? hyperattenuation of mid basilar artery   Hyperacute MRI: no evidence of infarct  MRA head/neck: no LVO, critical stenosis or dissection; no evidence of basilar abnormality     Intravenous Thrombolysis  Not given due to:   - minor/isolated/quickly resolving symptoms    Endovascular Treatment  Not initiated due to absence of proximal vessel occlusion    Impression  1. Acute onset disequilibrium. MRI without evidence of infarct. Consider TIA vs peripheral pathology vs other      Recommendations   -admission for further evaluation  "of TIA vs alternative etiologies   - Neurochecks and Vital Signs every 4 hours   - ASA 325mg x1 loading dose now, followed by 81mg daily starting 11/14  - Statin: PTA Lipitor 20mg  - 24-hour Telemetry  - Bedside Glucose Monitoring  - A1c, Lipid Panel  - PT/OT/SLP  - Stroke Education  - Euthermia, Euglycemia    My recommendations are based on the information provided over the phone by Efren Wynne's in-person providers. They are not intended to replace the clinical judgment of his in-person providers. I was not requested to personally see or examine the patient at this time.    Nanci AMBRIZ, CNP  Vascular Neurology  To page me or covering stroke neurology team member, click here: [facility]   Choose \"On Call\" tab at top, then search dropdown box for \"Neurology Adult\", select location, press Enter, then look for stroke/neuro ICU/telestroke.      "

## 2022-11-13 NOTE — ED PROVIDER NOTES
Emergency Department Encounter      NAME: Efren Wynne  AGE: 72 year old male  YOB: 1950  MRN: [id number]  EVALUATION DATE & TIME: No admission date for patient encounter.    PCP: Heraclio Meng    ED PROVIDER: Kameron Sanchez M.D.      Chief Complaint   Patient presents with     Dizziness         FINAL IMPRESSION:  1. Dizziness        MEDICAL DECISION MAKIN:33 AM I met with the patient, obtained history, performed an initial exam, and discussed options and plan for diagnostics and treatment here in the ED.   8:47 AM I spoke with the stroke team.  9:00 AM I spoke with the neurology. They are doing a non-contrast head CT.  9:24 AM I spoke with the radiologist. He stated that the non-contrast head CT showed that the basal artery was slightly dense. He recommended an MRI.  11:49 AM I spoke with stroke neurology. The MRI was negative and she recommends starting him on Asprin and either admitting him or having an expedited TIA workup done.  11:58 AM I updated the patient on his imaging results and dicussed with him the recommendations from the radiologist.     This patient is a 72-year-old male who presents with unsteadiness.  He says that [place] began to feel unsteady.  He was not vertiginous.  Coincidently he had been seeing ENT for her tinnitus that he noticed in the left ear.  The ENT doctor was considering imaging of the left neck to rule out a vascular cause in the neck that would cause this fluttering sensation that he was hearing in the left ear.  I was called emergently to the patient's bedside in triage as the triage nurse was concerned about a stroke code.  We initiated a tier 1 stroke code because the symptoms began an hour ago.  On exam he was a little unsteady with Romberg but otherwise exam was nonfocal.  A MRI of the brain was ordered as the patient had a history of contrast allergies.  The stroke neurologist wanted a noncontrast  head CT as it was going take an hour for the MRI to be done.  The noncontrast head CT showed a dense basilar artery but no active bleeding.  The MRI showed no acute findings.  There is no stroke or dissection seen.  The neurologist wanted the patient started on a baby aspirin and admitted.  The patient did not want to be admitted and wanted to go for the expedited outpatient TIA work-up.  I have referred him to neurology for this and he is can follow-up with his doctor to arrange an echocardiogram and event monitor.  His symptoms were gone when he was discharged.  He was not deemed to be a thrombolytic candidate because of his mild symptoms and the fact that they were resolving while he was in the ER    Pertinent Labs & Imaging studies reviewed. (See chart for details)      Critical care time 35 minutes    The importance of close follow up was discussed. We reviewed warning signs and symptoms, and I instructed Mr. Wynne to return to the emergency department immediately if he develops any new or worsening symptoms. I provided additional verbal discharge instructions. Mr. Wynne expressed understanding and agreement with this plan of care, his questions were answered, and he was discharged in stable condition.       MEDICATIONS GIVEN IN THE EMERGENCY:  Medications   gadobutrol (GADAVIST) injection 10 mL (10 mLs Intravenous Given 11/13/22 1026)       NEW PRESCRIPTIONS STARTED AT TODAY'S ER VISIT:  New Prescriptions    No medications on file          =================================================================    HPI    Patient information was obtained from: Patient    Use of : N/A         Efren Wynne is a 72 year old male with a past medical history of type 2 diabetes, hyperlipidemia, RBBB, and hypertension, who presents for evaluation of dizziness     Approximately one hour ago, the patient was at the [facility] this morning when he was heading down an escalator and started to experience severe  "dizziness. He reports that he felt like he was unable to stand or walk and needed to hold onto something. He denies any room spinning dizziness. The patient has also been experiencing an ear \"flutter\" sensation that has been on and off for about one week.  He reports that he saw ENT about this and was told that it was a type of tinnitus. ENT also wanted the patient to have an MRI to see if it could be something else. The patient reports that he is baseline unsteady with walking. He denies any history of heart disease or past strokes.The patient denies any numbness, weakness, headache, nausea, vomiting, speech or vision changes, or any other complaints.       REVIEW OF SYSTEMS   Review of Systems   Constitutional: Negative for chills and fever.   HENT: Positive for tinnitus.    Respiratory: Negative for cough, chest tightness and shortness of breath.    Cardiovascular: Negative for chest pain.   Gastrointestinal: Negative for abdominal pain, nausea and vomiting.   Musculoskeletal: Negative for back pain, neck pain and neck stiffness.   Neurological: Positive for dizziness. Negative for speech difficulty, weakness, numbness and headaches.   All other systems reviewed and are negative.       PAST MEDICAL HISTORY:  Past Medical History:   Diagnosis Date     Cancer (H) 2010    bladder     Diabetes mellitus (H)      Disease of thyroid gland      Hordeolum externum      Hypertension      Intestinal malabsorption      Obesity      Restless leg      Vitamin B 12 deficiency        PAST SURGICAL HISTORY:  Past Surgical History:   Procedure Laterality Date     CATARACT EXTRACTION, BILATERAL  11/13     CYSTOSCOPY, TRANSURETHRAL RESECTION (TUR) TUMOR BLADDER, COMBINED  11/2010     GASTRIC BYPASS  1983    stapling     KERATOPLASTY  2007,2010     VASCULAR SURGERY      varicose vein stripping       CURRENT MEDICATIONS:    No current facility-administered medications for this encounter.    Current Outpatient Medications:      " atorvastatin (LIPITOR) 20 MG tablet, Take 1 tablet (20 mg) by mouth daily, Disp: 90 tablet, Rfl: 3     cyanocobalamin (VITAMIN B12) 1000 MCG/ML injection, Inject 1 mL into the muscle every 30 days Take on the  of every month., Disp: , Rfl:      dulaglutide (TRULICITY) 1.5 MG/0.5ML pen, Inject 1.5 mg Subcutaneous once a week Take on Sundays., Disp: , Rfl:      gabapentin (NEURONTIN) 300 MG capsule, TAKE 4 CAPSULES BY MOUTH AT NIGHT, Disp: 360 capsule, Rfl: 3     insulin degludec (TRESIBA FLEXTOUCH) 200 UNIT/ML pen, Inject 40 Units Subcutaneous daily, Disp: 15 mL, Rfl: 3     lisinopril-hydrochlorothiazide (ZESTORETIC) 20-12.5 MG tablet, Take 1 tablet by mouth daily, Disp: 90 tablet, Rfl: 2     metFORMIN (GLUCOPHAGE) 500 MG tablet, Take 1,000 mg by mouth 2 times daily (with meals), Disp: , Rfl:      rOPINIRole (REQUIP) 1 MG tablet, Take 7-8 mg by mouth At Bedtime, Disp: , Rfl:      vitamin D3 (CHOLECALCIFEROL) 50 mcg (2000 units) tablet, Take 50 mcg by mouth every morning, Disp: , Rfl:     ALLERGIES:  Allergies   Allergen Reactions     Iodide Hives       FAMILY HISTORY:  Family History   Problem Relation Age of Onset     Anesthesia Reaction No family hx of        SOCIAL HISTORY:   Social History     Socioeconomic History     Marital status: Single   Tobacco Use     Smoking status: Former     Types: Cigarettes     Quit date: 8/3/1980     Years since quittin.3     Smokeless tobacco: Never   Substance and Sexual Activity     Alcohol use: No     Drug use: No       PHYSICAL EXAM:    Vitals: BP (!) 150/75   Pulse 53   Temp 97.7  F (36.5  C) (Oral)   Resp 30   Wt 108 kg (238 lb)   SpO2 100%   BMI 34.64 kg/m     Constitutional: Well developed, well nourished. Comfortable appearing.  HEAD:Normocephalic, atraumatic,   Eyes: PERRLA, EOM intact, conjunctiva clear, no discharge  ENT: mucous membranes moist, nose normal.   Neck- Supple, gross ROM intact.  No JVD.  No palpable nodes.  Pulmonary: Clear to auscultation  bilaterally, no respiratory distress, no wheezing, speaks full sentences easily.  Chest: No chest wall tenderness  Cardiovascular: Normal heart rate, regular rhythm, no murmurs. No lower extremity edema, 2+ DP pulses.   GI: Soft, no tenderness to deep palpation in all quadrants, not distended, no masses.  No hepatosplenomegaly.  Musculoskeletal: Moving all 4 extremities intentionally and without pain. No obvious deformity.  Back: No CVA tenderness  Skin: Warm, dry, no rash.  Neurologic: Alert & oriented x 3, speech clear, moving all extremities spontaneously.  Patient a little unsteady with romberg   Psychiatric: Affect normal, cooperative.     LAB:  All pertinent labs reviewed and interpreted.  Labs Ordered and Resulted from Time of ED Arrival to Time of ED Departure   BASIC METABOLIC PANEL - Abnormal       Result Value    Sodium 138      Potassium 3.9      Chloride 102      Carbon Dioxide (CO2) 24      Anion Gap 12      Urea Nitrogen 36 (*)     Creatinine 1.18      Calcium 9.5      Glucose 139 (*)     GFR Estimate 65     GLUCOSE BY METER - Abnormal    GLUCOSE BY METER POCT 136 (*)    INR - Normal    INR 1.03     PARTIAL THROMBOPLASTIN TIME - Normal    aPTT 27     TROPONIN I - Normal    Troponin I 0.02     GLUCOSE MONITOR NURSING POCT   CBC WITH PLATELETS AND DIFFERENTIAL    WBC Count 8.3      RBC Count 5.05      Hemoglobin 13.4      Hematocrit 41.2      MCV 82      MCH 26.5      MCHC 32.5      RDW 13.9      Platelet Count 232      % Neutrophils 54      % Lymphocytes 35      % Monocytes 8      % Eosinophils 2      % Basophils 1      % Immature Granulocytes 0      NRBCs per 100 WBC 0      Absolute Neutrophils 4.6      Absolute Lymphocytes 2.9      Absolute Monocytes 0.6      Absolute Eosinophils 0.1      Absolute Basophils 0.1      Absolute Immature Granulocytes 0.0      Absolute NRBCs 0.0         RADIOLOGY:  MRA Brain ([place] [facility] [place]) wo Contrast   Final Result   IMPRESSION:      BRAIN MRI:   1. No acute  intracranial process.   2. Brain atrophy and presumed chronic small vessel ischemic changes, as described.      MRA [place] [facility] [place]:   1. No high-grade stenosis or large vessel occlusion.   2. No intracranial aneurysm or high-flow vascular malformation.      MRA NECK:   1. No significant stenosis or occlusion of the cervical carotid or vertebral arteries.         MRA Neck (Carotids) wo & w Contrast   Final Result   IMPRESSION:      BRAIN MRI:   1. No acute intracranial process.   2. Brain atrophy and presumed chronic small vessel ischemic changes, as described.      MRA [place] [facility] [place]:   1. No high-grade stenosis or large vessel occlusion.   2. No intracranial aneurysm or high-flow vascular malformation.      MRA NECK:   1. No significant stenosis or occlusion of the cervical carotid or vertebral arteries.         MR Brain w/o & w Contrast   Final Result   IMPRESSION:      BRAIN MRI:   1. No acute intracranial process.   2. Brain atrophy and presumed chronic small vessel ischemic changes, as described.      MRA [place] [facility] [place]:   1. No high-grade stenosis or large vessel occlusion.   2. No intracranial aneurysm or high-flow vascular malformation.      MRA NECK:   1. No significant stenosis or occlusion of the cervical carotid or vertebral arteries.         CT Head w/o Contrast   Final Result   IMPRESSION:   1.  No CT evidence for acute intracranial hemorrhage or definite acute infarct.   2.  Hyperattenuation corresponding to the mid basilar artery is nonspecific given some skull base artifact in this region. This may be artifactual, related to atherosclerotic change, or potentially related to a thromboembolic event. Vascular imaging of    the intracranial circulation is advised.   3.  Brain atrophy and presumed chronic microvascular ischemic changes as above.      Findings were called to Dr. Sanchez at 11/13/2022 9:26 AM by Dr. ILDA Toro.        EKG:   Performed at: 9:02  Impression: Sinus rhythm. Right bundle  branch block. Left anterior fascicular block. Bifascicular block. Abnormal ECG.  Rate: 64  Rhythm: Sinus rhythm  QRS Interval: 150  QTc Interval: 424/437  Comparison: none  I have independently reviewed and interpreted the EKG(s) documented above.     PROCEDURES:   Procedures      National Institutes of Health Stroke Scale    Exam Interval: Baseline   Score    Level of consciousness: (0)   Alert, keenly responsive    LOC questions: (0)   Answers both questions correctly    LOC commands: (0)   Performs both tasks correctly    Best gaze: (0)   Normal    Visual: (0)   No visual loss    Facial palsy: (0)   Normal symmetrical movements    Motor arm (left):  0    Motor arm (right): (0)   No drift    Motor leg (left): (0)   No drift    Motor leg (right): (2)   Some effort against gravity    Limb ataxia:  0    Sensory: (0)   Normal- no sensory loss    Best language: (0)   Normal- no aphasia    Dysarthria: (0)   Normal    Extinction and inattention: (0)   No abnormality        Total Score:  0       I, Kady Elliott, am serving as a scribe to document services personally performed by Dr. Kameron Sanchez based on my observation and the provider's statements to me. I, Kameron Sanchez M.D. attest that Kady Elliott is acting in a scribe capacity, has observed my performance of the services and has documented them in accordance with my direction.      Kameron Sanchez M.D.  Emergency Medicine  [facility] EMERGENCY ROOM  [address] [facility] [id number] [phone]  Dept: [phone]       Kameron Sanchez MD  11/13/22 1600

## 2022-11-13 NOTE — PHARMACY-ADMISSION MEDICATION HISTORY
Pharmacy Note - Admission Medication History    Pertinent Provider Information: None. ______________________________________________________________________    Prior To Admission (PTA) med list completed and updated in EMR.       PTA Med List   Medication Sig Last Dose     atorvastatin (LIPITOR) 20 MG tablet Take 1 tablet (20 mg) by mouth daily 11/13/2022 at [facility]     cyanocobalamin (VITAMIN B12) 1000 MCG/ML injection Inject 1 mL into the muscle every 30 days Take on the 26th of every month. 10/26/2022     dulaglutide (TRULICITY) 1.5 MG/0.5ML pen Inject 1.5 mg Subcutaneous once a week Take on Sundays. 11/6/2022     gabapentin (NEURONTIN) 300 MG capsule TAKE 4 CAPSULES BY MOUTH AT NIGHT 11/12/2022     insulin degludec (TRESIBA FLEXTOUCH) 200 UNIT/ML pen Inject 40 Units Subcutaneous daily 11/12/2022 at [facility]     lisinopril-hydrochlorothiazide (ZESTORETIC) 20-12.5 MG tablet Take 1 tablet by mouth daily 11/13/2022 at [facility]     metFORMIN (GLUCOPHAGE) 500 MG tablet Take 1,000 mg by mouth 2 times daily (with meals) 11/13/2022 at x 1     rOPINIRole (REQUIP) 1 MG tablet Take 7-8 mg by mouth At Bedtime 11/12/2022     vitamin D3 (CHOLECALCIFEROL) 50 mcg (2000 units) tablet Take 50 mcg by mouth every morning 11/13/2022     Information source(s): Patient, Clinic records and [facility]  Method of interview communication: in-person    Summary of Changes to PTA Med List  New: None  Discontinued: None  Changed: None    Patient was asked about OTC/herbal products specifically.  PTA med list reflects this.    In the past week, patient estimated taking medication this percent of the time:  greater than 90%.    Allergies were reviewed, assessed, and updated with the patient.      Patient did not bring any medications to the hospital and can't retrieve from home. No multi-dose medications are available for use during hospital stay.     The information provided in this note is only as accurate as the sources available at the  time of the update(s).    Thank you for the opportunity to participate in the care of this patient.    Demetrice Real, BibiD, BCPS  11/13/2022 11:19 AM

## 2022-11-13 NOTE — ED NOTES
Plan of care pt to go to CT for non-con then to MRI due to allergy to contrast. 1:1 nursing care and assessments to be continued with pt to MRI.

## 2022-11-13 NOTE — DISCHARGE INSTRUCTIONS
The neurologist wanted you to start on a daily baby aspirin.  They want you to see your doctor for a expedited outpatient TIA work-up including an echocardiogram, event monitor and neurology consult.    I have ordered the neurology consult.  They should be calling you with an appointment.

## 2022-11-14 NOTE — TELEPHONE ENCOUNTER
Attempted to call pt, no answer. Left message requesting pt to call back clinic for more information regarding neurology appt scheduled for tomorrow.     Sent [employer] message with appointment information.

## 2022-11-14 NOTE — TELEPHONE ENCOUNTER
Attempted to call pt, no answer. Left message requesting pt to call back clinic. See recent PCP note for this encounter.       Information from neurology referral:   [facility] will call you to coordinate your care as prescribed by your provider. If you don't hear from a representative within 2 business days, please call [phone].    Called above number to inquire about STAT scheduling for patient to see neurology.     [place] appt tomorrow at 2 pm with Wendy Torres APRN CNP    Department Location: We are located at [address] [place] [address].  Our building is located on the corner of [facility] and [facility] from [facility].    From Sign In Desk:      Department Address: [address] [place] [id number]    Department Phone: [phone]      Actions needed:   Per 11/10/22 [employer] message encounter. Dr. Meng is requesting ENT records. When patient calls back please ask him where he was seen for ENT, what clinic, what provider, what day. Please have these records faxed to [facility] so Dr. Meng can review them.     Pt needs to have an appointment set up with neurology ASAP.     Inform patient of referral placed for ECHO and event recorder.

## 2022-11-14 NOTE — TELEPHONE ENCOUNTER
"It looks like they wanted to keep him in the hospital but he decided to leave.  They talked about doing a \"expedited\" TIA evaluation and a urgent referral to neurology.  I am not sure if that can actually occur in the [place] system.  Please clarify if patient did get set up to see neurology.  The imaging was not normal and he does need to see neurology.  I will place an order for an echo and a event recorder per their request but he needs to meet with neurology urgently.  Thank you.  "

## 2022-11-14 NOTE — TELEPHONE ENCOUNTER
Pt said that he is in [place] right now and it leaving [place] on Thursday. He rescheduled the neurology appointment for 11/22/22 (virtual).     Pt stated that he feels fine and he feels comfortable waiting for appointment 11/22/22.     ENT - Sara in [place] - MICHELLE Ruano - pt saw her 11/10/22    [facility]  [address] [place] [id number]  Get directions  [phone]    There are notes from this visit in patient's chart.

## 2022-11-21 RX ORDER — CYANOCOBALAMIN 1000 UG/ML
1 INJECTION, SOLUTION INTRAMUSCULAR; SUBCUTANEOUS
Qty: 30 ML | Refills: 3 | Status: SHIPPED | OUTPATIENT
Start: 2022-11-21

## 2022-11-21 RX ORDER — LISINOPRIL AND HYDROCHLOROTHIAZIDE 12.5; 2 MG/1; MG/1
1 TABLET ORAL DAILY
Qty: 90 TABLET | Refills: 2 | Status: SHIPPED | OUTPATIENT
Start: 2022-11-21 | End: 2023-07-25

## 2022-11-22 ENCOUNTER — VIRTUAL VISIT (OUTPATIENT)
Dept: NEUROLOGY | Facility: CLINIC | Age: 72
End: 2022-11-22
Attending: EMERGENCY MEDICINE
Payer: COMMERCIAL

## 2022-11-22 ENCOUNTER — HOSPITAL ENCOUNTER (OUTPATIENT)
Dept: CARDIOLOGY | Facility: CLINIC | Age: 72
Discharge: HOME | End: 2022-11-22
Attending: INTERNAL MEDICINE
Payer: COMMERCIAL

## 2022-11-22 DIAGNOSIS — R42 DIZZINESS: ICD-10-CM

## 2022-11-22 DIAGNOSIS — G45.9 TIA (TRANSIENT ISCHEMIC ATTACK): ICD-10-CM

## 2022-11-22 PROCEDURE — 93306 TTE W/DOPPLER COMPLETE: CPT

## 2022-11-22 PROCEDURE — 99204 OFFICE O/P NEW MOD 45 MIN: CPT | Mod: 95

## 2022-11-22 PROCEDURE — 93306 TTE W/DOPPLER COMPLETE: CPT | Mod: 26 | Performed by: INTERNAL MEDICINE

## 2022-11-22 PROCEDURE — 93270 REMOTE 30 DAY ECG REV/REPORT: CPT

## 2022-11-22 PROCEDURE — 93272 ECG/REVIEW INTERPRET ONLY: CPT | Performed by: INTERNAL MEDICINE

## 2022-11-22 NOTE — NURSING NOTE
No chief complaint on file.    Chief Complaint   Patient presents with     Stroke       Marisela Boyer on 11/22/2022 at 8:24 AM

## 2022-11-22 NOTE — LETTER
11/22/2022         RE: Efrne Wynne  [address] [facility] [address]        Dear Colleague,    Thank you for referring your patient, Efren Wynne, to the [facility] [place]. Please see a copy of my visit note below.    __  ________________________________________________________    ___________________________________  ESTABLISHED PATIENT NEUROLOGY NOTE    DATE OF VISIT: 11/22/2022  MRN: [id number]  PATIENT NAME: Efren Wynne  YOB: 1950      Chief Complaint: Patient presents with:  Stroke      SUBJECTIVE:                                                      HISTORY OF PRESENT ILLNESS:  History of Present Illness: Efren Wynne is a 72 year old male presenting for follow-up for TIA work-up.     He was hospitalized at [facility] on 11/13/22. Prior to the hospital stay, he had a past medical history of type 2 diabetes, hyperlipidemia, RBBB, and hypertension.    Per chart review:  He presented to the hospital with unsteadiness. He says that he was getting off an escalator at the airport when he began to feel unsteady.  He was not vertiginous.  Coincidently he had been seeing ENT for her tinnitus that he noticed in the left ear.  The ENT doctor was considering imaging of the left neck to rule out a vascular cause in the neck that would cause this fluttering sensation that he was hearing in the left ear.  I was called emergently to the patient's bedside in triage as the triage nurse was concerned about a stroke code.  We initiated a tier 1 stroke code because the symptoms began an hour ago.  On exam he was a little unsteady with Romberg but otherwise exam was nonfocal.  A MRI of the brain was ordered as the patient had a history of contrast allergies.  The stroke neurologist wanted a noncontrast head CT as it was going take an hour for the MRI to be done.  The noncontrast head CT showed a dense basilar artery but no active bleeding.  The MRI  showed no acute findings.  There is no stroke or dissection seen.  The neurologist wanted the patient started on a baby aspirin and admitted.  The patient did not want to be admitted and wanted to go for the expedited outpatient TIA work-up.  I have referred him to neurology for this and he is can follow-up with his doctor to arrange an echocardiogram and event monitor.  His symptoms were gone when he was discharged.  He was not deemed to be a thrombolytic candidate because of his mild symptoms and the fact that they were resolving while he was in the ER    Stroke Evaluation summarized:  MRI/Head CT: Brain atrophy and presumed chronic small vessel ischemic changes, as described  Head vessels: No high-grade stenosis or large vessel occlusion, No intracranial aneurysm or high-flow vascular malformation.  Neck vessels: No significant stenosis or occlusion of the cervical carotid or vertebral arteries.  Echo: ordered by PCP  EKG/Tele: Sinus rhythm ,Right bundle branch block  Left anterior fascicular block  LDL: 38  A1c: 7.8  Troponin: 0.02   Other testing: Cardiac event monitor - ordered by PCP    He was started on Asprin 81 mg for recurrent stroke prevention.    Today 11/22/22  Since being discharged, he has remained symptom free. He reported having 5 to 30 seconds of dizziness while at the airport. He canceled his flight and went into the ED. He was pleased with the care he received in th ED. He denies any residual effects. No changes in vision, speech or focal weakness or numbness. He denies any problems with his swallowing. He feels back to his baseline.  He meets with physical therapy for difficulty with ambulating due to chronic knee and ankle pain.     Risk factors. Basim was started on ASA 81 mg. He has not started this medication yet but plans to pick it up from the store.  The cardiac event monitor and echo cardiogram was ordered by his primary care doctor.  He has an appointment today to have these set up and  completed.  His last blood pressure was 150/75 in the emergency department on 11/13/2022.  We discussed ordering a blood pressure cuff at home.  He is hesitant on this. He is not sure he would take his BP at home. His LDL was last checked on 5/9/2022, it was 38.  His A1c is 7.8, he is followed by endocrinology for this.  He discusses binge eating.  He states that he just joined a support group for this and is actively working on it. He is not interested in a DM educator at this time.      Current Medications:   atorvastatin (LIPITOR) 20 MG tablet, Take 1 tablet (20 mg) by mouth daily  cyanocobalamin (CYANOCOBALAMIN) 1000 MCG/ML injection, Inject 1 mL (1,000 mcg) into the muscle every 30 days Take on the 26th of every month.  dulaglutide (TRULICITY) 1.5 MG/0.5ML pen, Inject 1.5 mg Subcutaneous once a week Take on Sundays.  gabapentin (NEURONTIN) 300 MG capsule, TAKE 4 CAPSULES BY MOUTH AT NIGHT  insulin degludec (TRESIBA FLEXTOUCH) 200 UNIT/ML pen, Inject 40 Units Subcutaneous daily  lisinopril-hydrochlorothiazide (ZESTORETIC) 20-12.5 MG tablet, Take 1 tablet by mouth daily  metFORMIN (GLUCOPHAGE) 500 MG tablet, Take 1,000 mg by mouth 2 times daily (with meals)  rOPINIRole (REQUIP) 1 MG tablet, Take 7-8 mg by mouth At Bedtime  vitamin D3 (CHOLECALCIFEROL) 50 mcg (2000 units) tablet, Take 50 mcg by mouth every morning    No current facility-administered medications on file prior to visit.    Past Medical History:   Patient  has a past medical history of Cancer (H) (2010), Diabetes mellitus (H), Disease of thyroid gland, Hordeolum externum, Hypertension, Intestinal malabsorption, Obesity, Restless leg, and Vitamin B 12 deficiency.  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  Family and Social History:  Reviewed,  and he  reports that he quit smoking about 42 years ago. His smoking use included cigarettes. He has never used smokeless tobacco. He reports that he does not drink alcohol and does not use drugs.  Reviewed, and family history is not on file.    RECENT DIAGNOSTIC STUDIES:   Labs:    Coagulation studies:  Recent Labs   Lab Test 11/13/22  0900   INR 1.03        Lipid panel:  Recent Labs   Lab Test 05/09/22  1438 10/12/20  0804   CHOL 94 103   HDL 45 51   LDL 38 42   TRIG 53 52       HbA1C:  Recent Labs   Lab Test 05/09/22  1438   A1C 7.7*       Troponin:  No lab results found.  Recent Labs   Lab Test 11/13/22  0900   TROPONINI 0.02     No lab results found.    Imaging:   EXAM: MR BRAIN WITHOUT AND WITH CONTRAST, MRA BRAIN ([place] [facility]) WITHOUT CONTRAST, MRA NECK (CAROTIDS) WITHOUT AND WITH CONTRAST  LOCATION: [facility]  DATE/TIME: 11/13/2022, 10:29 AM  BRAIN MRI:  1. No acute intracranial process.  2. Brain atrophy and presumed chronic small vessel ischemic changes, as described.  MRA [place] [facility]:  1. No high-grade stenosis or large vessel occlusion.  2. No intracranial aneurysm or high-flow vascular malformation.  MRA NECK:  1. No significant stenosis or occlusion of the cervical carotid or vertebral arteries.    11/13/22  Sinus rhythm   Right bundle branch block   Left anterior fascicular block     REVIEW OF SYSTEMS:                                                      10-point review of systems is negative except as mentioned above in HPI.    EXAM:                                                      Physical Exam: Virtual visit  Vitals: There were no vitals taken for this visit.  BMI= There is no height or weight on file to calculate BMI.  General:  no acute distress  HEENT:  normocephalic/atraumatic  Pulmonary:  no respiratory distress    Neurologic  Mental Status:  alert, oriented x 3, follows commands, speech clear and fluent,  Cranial Nerves:  EOMI with normal smooth  pursuit, facial movements symmetric, hearing not formally tested but intact to conversation, no dysarthria, shoulder shrug equal bilaterally, tongue protrusion midline  Motor:  no abnormal movements, able to move all limbs antigravity spontaneously with no signs of hemiparesis observed, no pronator drift  Reflexes:  unable to test (telestroke)  Sensory:  unable to test (telestroke)  Coordination:  normal finger-to-nose,  rapid alternating movements symmetric  Station/Gait:  unable to test (telestroke)     ASSESSMENT and PLAN:                                                      Assessment:    ICD-10-CM    1. Dizziness  R42 Adult Neurology  Referral        Efern Wynne is a 72 year old male presenting for follow-up for TIA work-up. He was hospitalized at [facility] on 11/13/22. Prior to the hospital visit, he had a past medical history of type 2 diabetes, hyperlipidemia, RBBB, and hypertension. He presented to the hospital with unsteadiness. He reports full resolution of his symptoms.  He works with a physical therapy for chronic knee and ankle pain. His PCP ordered the cardiac event monitor and Echocardiogram.  I encouraged him to follow-up when the results are complete.    Basim was placed on aspirin 81 mg daily.  He states he has not started this yet.  I encouraged him to start this medication.  His blood pressure was elevated 150/75 in the emergency department.  I encouraged him to start a log of blood pressure.  He should follow-up with his primary care provider if his pressures remain elevated.  He is unsure that he wants to monitor at home with a blood pressure cuff.  He will update Neurology or his primary care provider to get a cuff when he is ready.  A1c was 7.8.  He follows with endocrinology for this, has an appointment in a few months.  LDL was 38.  He is taking his medications as prescribed and tolerating them well.  I encouraged him to set up a follow-up with his primary care  provider for his hypertension.    We discussed interventions, will plan to see the patient back in 6 months. Basim understands and agrees with this plan.     Plan:     Diagnostic workup  - Cardiac event monitor ordered through PCP  Send me a message when event monitor is complete so I can review the results.   - Echocardiogram ordered through PCP     Secondary prevention  - Start Asprin 81 mg daily.     Risk Factors   Elevated blood Pressure  BP: 150/75 on 11/13/22  - Goal <130/80  - Continue preventive therapy:lisinopril-hydrochlorothiazide (ZESTORETIC) 20-12.5 MG tablet  - Monitor blood pressure, start a log of your blood pressures randomly throughout your day  - Let us know if you would like me to order you a blood pressure cuff for home  .  - Follow up with primary care provider to optimize medications if needed     Elevated cholesterol levels   LDL: 38   - Goal < 70 mg/dl  - Continue preventive therapy: atorvastatin (LIPITOR) 20 MG tablet  - follow up with primary care provider annually     Diabetes   A1c: 7.8  - Goal: < 7.0%  - Continue preventive therapy: insulin degludec (TRESIBA FLEXTOUCH) 200 UNIT/ML pen,  metFORMIN (GLUCOPHAGE) 500 MG tablet and dulaglutide (TRULICITY) 1.5 MG/0.5ML pen  - Follow up with Endocrinology to optimize medications to control A1c.   - Start support group for binge eating    --- Plan on follow up in the Neurology Clinic in 6 months.  --- Please feel free to reach out if you have any further questions or concerns.  --- Seek immediate medical attention if an emergency arises or if your health becomes progressively worse.     For any acute neurologic deficits he was advised to go directly to the hospital rather than call the clinic.    It was a pleasure to meet you today!     Billing:    Total Time: Total time spent for face to face visit, reviewing labs/imaging studies, counseling and coordination of care was: 45 Minutes spent on the date of the encounter doing chart review, review of  test results, patient visit and documentation       This note was dictated using voice recognition software.  Any grammatical or context distortions are unintentional and inherent to the software.    Wendy Torres, FELISHA, APRN, CNP  [facility]           Again, thank you for allowing me to participate in the care of your patient.        Sincerely,        KATINA Shen CNP

## 2022-11-22 NOTE — PROGRESS NOTES
__  ________________________________________________________    ___________________________________  ESTABLISHED PATIENT NEUROLOGY NOTE    DATE OF VISIT: 11/22/2022  MRN: [id number]  PATIENT NAME: Efren Wynne  YOB: 1950      Chief Complaint: Patient presents with:  Stroke      SUBJECTIVE:                                                      HISTORY OF PRESENT ILLNESS:  History of Present Illness: Efren Wynne is a 72 year old male presenting for follow-up for TIA work-up.     He was hospitalized at [facility] on 11/13/22. Prior to the hospital stay, he had a past medical history of type 2 diabetes, hyperlipidemia, RBBB, and hypertension.    Per chart review:  He presented to the hospital with unsteadiness. He says that he was getting off an escalator at the airport when he began to feel unsteady.  He was not vertiginous.  Coincidently he had been seeing ENT for her tinnitus that he noticed in the left ear.  The ENT doctor was considering imaging of the left neck to rule out a vascular cause in the neck that would cause this fluttering sensation that he was hearing in the left ear.  I was called emergently to the patient's bedside in triage as the triage nurse was concerned about a stroke code.  We initiated a tier 1 stroke code because the symptoms began an hour ago.  On exam he was a little unsteady with Romberg but otherwise exam was nonfocal.  A MRI of the brain was ordered as the patient had a history of contrast allergies.  The stroke neurologist wanted a noncontrast head CT as it was going take an hour for the MRI to be done.  The noncontrast head CT showed a dense basilar artery but no active bleeding.  The MRI showed no acute findings.  There is no stroke or dissection seen.  The neurologist wanted the patient started on a baby aspirin and admitted.  The patient did not want to be admitted and wanted to go for the expedited outpatient TIA work-up.  I have referred him to  neurology for this and he is can follow-up with his doctor to arrange an echocardiogram and event monitor.  His symptoms were gone when he was discharged.  He was not deemed to be a thrombolytic candidate because of his mild symptoms and the fact that they were resolving while he was in the ER    Stroke Evaluation summarized:  MRI/Head CT: Brain atrophy and presumed chronic small vessel ischemic changes, as described  Head vessels: No high-grade stenosis or large vessel occlusion, No intracranial aneurysm or high-flow vascular malformation.  Neck vessels: No significant stenosis or occlusion of the cervical carotid or vertebral arteries.  Echo: ordered by PCP  EKG/Tele: Sinus rhythm ,Right bundle branch block  Left anterior fascicular block  LDL: 38  A1c: 7.8  Troponin: 0.02   Other testing: Cardiac event monitor - ordered by PCP    He was started on Asprin 81 mg for recurrent stroke prevention.    Today 11/22/22  Since being discharged, he has remained symptom free. He reported having 5 to 30 seconds of dizziness while at the airport. He canceled his flight and went into the ED. He was pleased with the care he received in th ED. He denies any residual effects. No changes in vision, speech or focal weakness or numbness. He denies any problems with his swallowing. He feels back to his baseline.  He meets with physical therapy for difficulty with ambulating due to chronic knee and ankle pain.     Risk factors. Basim was started on ASA 81 mg. He has not started this medication yet but plans to pick it up from the store.  The cardiac event monitor and echo cardiogram was ordered by his primary care doctor.  He has an appointment today to have these set up and completed.  His last blood pressure was 150/75 in the emergency department on 11/13/2022.  We discussed ordering a blood pressure cuff at home.  He is hesitant on this. He is not sure he would take his BP at home. His LDL was last checked on 5/9/2022, it was 38.  His  A1c is 7.8, he is followed by endocrinology for this.  He discusses binge eating.  He states that he just joined a support group for this and is actively working on it. He is not interested in a DM educator at this time.      Current Medications:   atorvastatin (LIPITOR) 20 MG tablet, Take 1 tablet (20 mg) by mouth daily  cyanocobalamin (CYANOCOBALAMIN) 1000 MCG/ML injection, Inject 1 mL (1,000 mcg) into the muscle every 30 days Take on the 26th of every month.  dulaglutide (TRULICITY) 1.5 MG/0.5ML pen, Inject 1.5 mg Subcutaneous once a week Take on Sundays.  gabapentin (NEURONTIN) 300 MG capsule, TAKE 4 CAPSULES BY MOUTH AT NIGHT  insulin degludec (TRESIBA FLEXTOUCH) 200 UNIT/ML pen, Inject 40 Units Subcutaneous daily  lisinopril-hydrochlorothiazide (ZESTORETIC) 20-12.5 MG tablet, Take 1 tablet by mouth daily  metFORMIN (GLUCOPHAGE) 500 MG tablet, Take 1,000 mg by mouth 2 times daily (with meals)  rOPINIRole (REQUIP) 1 MG tablet, Take 7-8 mg by mouth At Bedtime  vitamin D3 (CHOLECALCIFEROL) 50 mcg (2000 units) tablet, Take 50 mcg by mouth every morning    No current facility-administered medications on file prior to visit.    Past Medical History:   Patient  has a past medical history of Cancer (H) (2010), Diabetes mellitus (H), Disease of thyroid gland, Hordeolum externum, Hypertension, Intestinal malabsorption, Obesity, Restless leg, and Vitamin B 12 deficiency.  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  Family and Social History:  Reviewed, and he  reports that he quit smoking about 42 years ago. His smoking use included cigarettes. He has never used smokeless tobacco. He reports that he does not drink alcohol and does not use drugs.  Reviewed, and family history is not on file.    RECENT DIAGNOSTIC  STUDIES:   Labs:    Coagulation studies:  Recent Labs   Lab Test 11/13/22  0900   INR 1.03        Lipid panel:  Recent Labs   Lab Test 05/09/22  1438 10/12/20  0804   CHOL 94 103   HDL 45 51   LDL 38 42   TRIG 53 52       HbA1C:  Recent Labs   Lab Test 05/09/22  1438   A1C 7.7*       Troponin:  No lab results found.  Recent Labs   Lab Test 11/13/22  0900   TROPONINI 0.02     No lab results found.    Imaging:   EXAM: MR BRAIN WITHOUT AND WITH CONTRAST, MRA BRAIN ([facility]) WITHOUT CONTRAST, MRA NECK (CAROTIDS) WITHOUT AND WITH CONTRAST  LOCATION: [facility]  DATE/TIME: 11/13/2022, 10:29 AM  BRAIN MRI:  1. No acute intracranial process.  2. Brain atrophy and presumed chronic small vessel ischemic changes, as described.  MRA [facility]:  1. No high-grade stenosis or large vessel occlusion.  2. No intracranial aneurysm or high-flow vascular malformation.  MRA NECK:  1. No significant stenosis or occlusion of the cervical carotid or vertebral arteries.    11/13/22  Sinus rhythm   Right bundle branch block   Left anterior fascicular block     REVIEW OF SYSTEMS:                                                      10-point review of systems is negative except as mentioned above in HPI.    EXAM:                                                      Physical Exam: Virtual visit  Vitals: There were no vitals taken for this visit.  BMI= There is no height or weight on file to calculate BMI.  General:  no acute distress  HEENT:  normocephalic/atraumatic  Pulmonary:  no respiratory distress    Neurologic  Mental Status:  alert, oriented x 3, follows commands, speech clear and fluent,  Cranial Nerves:  EOMI with normal smooth pursuit, facial movements symmetric, hearing not formally tested but intact to conversation, no dysarthria, shoulder shrug equal bilaterally, tongue protrusion midline  Motor:  no abnormal movements, able to move all limbs antigravity spontaneously with no signs of  hemiparesis observed, no pronator drift  Reflexes:  unable to test (telestroke)  Sensory:  unable to test (telestroke)  Coordination:  normal finger-to-nose,  rapid alternating movements symmetric  Station/Gait:  unable to test (telestroke)     ASSESSMENT and PLAN:                                                      Assessment:    ICD-10-CM    1. Dizziness  R42 Adult Neurology  Referral        Efren Wynne is a 72 year old male presenting for follow-up for TIA work-up. He was hospitalized at [facility] on 11/13/22. Prior to the hospital visit, he had a past medical history of type 2 diabetes, hyperlipidemia, RBBB, and hypertension. He presented to the hospital with unsteadiness. He reports full resolution of his symptoms.  He works with a physical therapy for chronic knee and ankle pain. His PCP ordered the cardiac event monitor and Echocardiogram.  I encouraged him to follow-up when the results are complete.    Basim was placed on aspirin 81 mg daily.  He states he has not started this yet.  I encouraged him to start this medication.  His blood pressure was elevated 150/75 in the emergency department.  I encouraged him to start a log of blood pressure.  He should follow-up with his primary care provider if his pressures remain elevated.  He is unsure that he wants to monitor at home with a blood pressure cuff.  He will update Neurology or his primary care provider to get a cuff when he is ready.  A1c was 7.8.  He follows with endocrinology for this, has an appointment in a few months.  LDL was 38.  He is taking his medications as prescribed and tolerating them well.  I encouraged him to set up a follow-up with his primary care provider for his hypertension.    We discussed interventions, will plan to see the patient back in 6 months. Basim understands and agrees with this plan.     Plan:     Diagnostic workup  - Cardiac event monitor ordered through PCP  Send me a message when event monitor  is complete so I can review the results.   - Echocardiogram ordered through PCP     Secondary prevention  - Start Asprin 81 mg daily.     Risk Factors   Elevated blood Pressure  BP: 150/75 on 11/13/22  - Goal <130/80  - Continue preventive therapy:lisinopril-hydrochlorothiazide (ZESTORETIC) 20-12.5 MG tablet  - Monitor blood pressure, start a log of your blood pressures randomly throughout your day  - Let us know if you would like me to order you a blood pressure cuff for home  .  - Follow up with primary care provider to optimize medications if needed     Elevated cholesterol levels   LDL: 38   - Goal < 70 mg/dl  - Continue preventive therapy: atorvastatin (LIPITOR) 20 MG tablet  - follow up with primary care provider annually     Diabetes   A1c: 7.8  - Goal: < 7.0%  - Continue preventive therapy: insulin degludec (TRESIBA FLEXTOUCH) 200 UNIT/ML pen,  metFORMIN (GLUCOPHAGE) 500 MG tablet and dulaglutide (TRULICITY) 1.5 MG/0.5ML pen  - Follow up with Endocrinology to optimize medications to control A1c.   - Start support group for binge eating    --- Plan on follow up in the Neurology Clinic in 6 months.  --- Please feel free to reach out if you have any further questions or concerns.  --- Seek immediate medical attention if an emergency arises or if your health becomes progressively worse.     For any acute neurologic deficits he was advised to go directly to the hospital rather than call the clinic.    It was a pleasure to meet you today!     Billing:    Total Time: Total time spent for face to face visit, reviewing labs/imaging studies, counseling and coordination of care was: 45 Minutes spent on the date of the encounter doing chart review, review of test results, patient visit and documentation       This note was dictated using voice recognition software.  Any grammatical or context distortions are unintentional and inherent to the software.    Wendy Torres, DNP, APRN, CNP  [facility]

## 2022-11-22 NOTE — PATIENT INSTRUCTIONS
Plan:     Diagnostic workup  - Cardiac event monitor ordered through PCP  Send me a message through [facility] when the event monitor is complete so I can review the results.   - Echocardiogram ordered through PCP     Secondary prevention  - Start Asprin 81 mg daily.     Risk Factors   Elevated blood Pressure  BP: 150/75 on 11/13/22  - Goal <130/80  - Continue preventive therapy:lisinopril-hydrochlorothiazide (ZESTORETIC) 20-12.5 MG tablet  - Monitor blood pressure, start a log of your blood pressures randomly throughout your day  - Let us know if you would like me to order you a blood pressure cuff for home  .  - Follow up with primary care provider to optimize medications if needed     Elevated cholesterol levels   LDL: 38   - Goal < 70 mg/dl  - Continue preventive therapy: atorvastatin (LIPITOR) 20 MG tablet  - follow up with primary care provider annually     Diabetes   A1c: 7.8  - Goal: < 7.0%  - Continue preventive therapy: insulin degludec (TRESIBA FLEXTOUCH) 200 UNIT/ML pen,  metFORMIN (GLUCOPHAGE) 500 MG tablet and dulaglutide (TRULICITY) 1.5 MG/0.5ML pen  - Follow up with Endocrinology to optimize medications to control A1c.   - Start support group for binge eating    --- Plan on follow up in the Neurology Clinic in 6 months.  --- Please feel free to reach out if you have any further questions or concerns.  --- Seek immediate medical attention if an emergency arises or if your health becomes progressively worse.     For any acute neurologic deficits he was advised to go directly to the hospital rather than call the clinic.    It was a pleasure to meet you today!

## 2022-12-06 ENCOUNTER — HOSPITAL ENCOUNTER (OUTPATIENT)
Dept: PHYSICAL THERAPY | Facility: REHABILITATION | Age: 72
Discharge: HOME | End: 2022-12-06
Payer: COMMERCIAL

## 2022-12-06 DIAGNOSIS — G63 POLYNEUROPATHY ASSOCIATED WITH UNDERLYING DISEASE (H): Primary | ICD-10-CM

## 2022-12-06 DIAGNOSIS — R29.6 RECURRENT FALLS: ICD-10-CM

## 2022-12-06 PROCEDURE — 97116 GAIT TRAINING THERAPY: CPT | Mod: GP | Performed by: PHYSICAL THERAPIST

## 2022-12-06 PROCEDURE — 97110 THERAPEUTIC EXERCISES: CPT | Mod: GP | Performed by: PHYSICAL THERAPIST

## 2022-12-16 ENCOUNTER — HOSPITAL ENCOUNTER (OUTPATIENT)
Dept: PHYSICAL THERAPY | Facility: REHABILITATION | Age: 72
Discharge: HOME | End: 2022-12-16
Payer: COMMERCIAL

## 2022-12-16 DIAGNOSIS — G63 POLYNEUROPATHY ASSOCIATED WITH UNDERLYING DISEASE (H): Primary | ICD-10-CM

## 2022-12-16 DIAGNOSIS — R29.6 RECURRENT FALLS: ICD-10-CM

## 2022-12-16 PROCEDURE — 97110 THERAPEUTIC EXERCISES: CPT | Mod: GP | Performed by: PHYSICAL THERAPIST

## 2022-12-28 ENCOUNTER — HOSPITAL ENCOUNTER (OUTPATIENT)
Dept: PHYSICAL THERAPY | Facility: REHABILITATION | Age: 72
Discharge: HOME | End: 2022-12-28
Payer: COMMERCIAL

## 2022-12-28 DIAGNOSIS — R29.6 RECURRENT FALLS: ICD-10-CM

## 2022-12-28 DIAGNOSIS — G63 POLYNEUROPATHY ASSOCIATED WITH UNDERLYING DISEASE (H): Primary | ICD-10-CM

## 2022-12-28 PROCEDURE — 97110 THERAPEUTIC EXERCISES: CPT | Mod: GP | Performed by: PHYSICAL THERAPIST

## 2022-12-28 PROCEDURE — 97112 NEUROMUSCULAR REEDUCATION: CPT | Mod: GP | Performed by: PHYSICAL THERAPIST

## 2023-01-04 ENCOUNTER — HOSPITAL ENCOUNTER (OUTPATIENT)
Dept: PHYSICAL THERAPY | Facility: REHABILITATION | Age: 73
Discharge: HOME | End: 2023-01-04
Payer: COMMERCIAL

## 2023-01-04 DIAGNOSIS — R29.6 RECURRENT FALLS: ICD-10-CM

## 2023-01-04 DIAGNOSIS — G63 POLYNEUROPATHY ASSOCIATED WITH UNDERLYING DISEASE (H): Primary | ICD-10-CM

## 2023-01-04 PROCEDURE — 97110 THERAPEUTIC EXERCISES: CPT | Mod: GP | Performed by: PHYSICAL THERAPIST

## 2023-01-04 PROCEDURE — 97112 NEUROMUSCULAR REEDUCATION: CPT | Mod: GP | Performed by: PHYSICAL THERAPIST

## 2023-01-09 ENCOUNTER — HOSPITAL ENCOUNTER (OUTPATIENT)
Dept: PHYSICAL THERAPY | Facility: REHABILITATION | Age: 73
Discharge: HOME | End: 2023-01-09
Payer: COMMERCIAL

## 2023-01-09 DIAGNOSIS — G63 POLYNEUROPATHY ASSOCIATED WITH UNDERLYING DISEASE (H): Primary | ICD-10-CM

## 2023-01-09 DIAGNOSIS — R29.6 RECURRENT FALLS: ICD-10-CM

## 2023-01-09 PROCEDURE — 97112 NEUROMUSCULAR REEDUCATION: CPT | Mod: GP | Performed by: PHYSICAL THERAPIST

## 2023-01-09 PROCEDURE — 97110 THERAPEUTIC EXERCISES: CPT | Mod: GP | Performed by: PHYSICAL THERAPIST

## 2023-02-02 ENCOUNTER — HOSPITAL ENCOUNTER (OUTPATIENT)
Dept: PHYSICAL THERAPY | Facility: REHABILITATION | Age: 73
Discharge: HOME | End: 2023-02-02
Payer: COMMERCIAL

## 2023-02-02 DIAGNOSIS — R29.6 RECURRENT FALLS: ICD-10-CM

## 2023-02-02 DIAGNOSIS — G63 POLYNEUROPATHY ASSOCIATED WITH UNDERLYING DISEASE (H): Primary | ICD-10-CM

## 2023-02-02 PROCEDURE — 97110 THERAPEUTIC EXERCISES: CPT | Mod: GP | Performed by: PHYSICAL THERAPIST

## 2023-02-02 NOTE — ADDENDUM NOTE
Encounter addended by: Kendall Olivera, PT on: 2/2/2023 5:00 PM   Actions taken: Pend clinical note, Flowsheet accepted, Clinical Note Signed, Document created, Document edited

## 2023-02-02 NOTE — PROGRESS NOTES
[facility]    OUTPATIENT PHYSICAL THERAPY  PLAN OF TREATMENT FOR OUTPATIENT REHABILITATION AND PROGRESS NOTE           Patient's Last Name, First Name, Efren Manuel Date of Birth  1950   Provider's Name  [facility] Medical Record No.  [id number]    Onset Date  11/3/22 Start of Care Date  11/9/22   Type:     _X_PT   ___OT   ___SLP Medical Diagnosis  Polyneuropathy associated with underlying disease (H), Recurrent falls   PT Diagnosis  Recurrent falls Plan of Treatment  Frequency/Duration: 1x week  Certification date from 2/7/23 to 4/8/23     Goals:  Goal Identifier HEP   Goal Description Patient will be independent with HEP and self management of symptoms   Target Date 04/08/23   Date Met      Progress (detail required for progress note): improving, continue goal     Goal Identifier transfers   Goal Description Patient will perform 10 sit to stand transfers without UE in 30 seconds to demonstrate increased functional strength and decreased risk of falls   Target Date 04/08/23   Date Met      Progress (detail required for progress note): Improving, not met, continue goal     Goal Identifier curbs   Goal Description Patient will ascend/descend curbs independently or with modified indepdence with SEC safely without pain   Target Date 04/08/23   Date Met      Progress (detail required for progress note): able to perform safely with pain, continue goal     Beginning/End Dates of Progress Note Reporting Period:  11/9/22 to 2/2/23    Progress Toward Goals:   Progress this reporting period: see above    Client Self (Subjective) Report for Progress Note Reporting Period: He has had an eye infection that is getting better.  He hasn't done any exercise or gone to the gym since last visit.  He wants to get back on track with his exercise, because he was feeling so  good when he was doing it.  When asked he initially indicated no falls since last visit, but then did admit he did slip on a rug in his bathroom and fell onto his sink without injury.  Therapist recommended using a rug in the bathroom with no slip bottom.  Patient reluctant to change out rug despite emphasis on safety.      Objective Measurements:   Objective Measure: LEFS @ eval 27/80                      I CERTIFY THE NEED FOR THESE SERVICES FURNISHED UNDER        THIS PLAN OF TREATMENT AND WHILE UNDER MY CARE     (Physician co-signature of this document indicates review and certification of the therapy plan).                Referring Provider: Dr. Heraclio Olivera, PT

## 2023-02-17 ENCOUNTER — HOSPITAL ENCOUNTER (OUTPATIENT)
Dept: PHYSICAL THERAPY | Facility: REHABILITATION | Age: 73
Discharge: HOME | End: 2023-02-17
Payer: COMMERCIAL

## 2023-02-17 DIAGNOSIS — R29.6 RECURRENT FALLS: ICD-10-CM

## 2023-02-17 DIAGNOSIS — G63 POLYNEUROPATHY ASSOCIATED WITH UNDERLYING DISEASE (H): Primary | ICD-10-CM

## 2023-02-17 PROCEDURE — 97110 THERAPEUTIC EXERCISES: CPT | Mod: GP | Performed by: PHYSICAL THERAPIST

## 2023-02-17 PROCEDURE — 97112 NEUROMUSCULAR REEDUCATION: CPT | Mod: GP | Performed by: PHYSICAL THERAPIST

## 2023-03-10 ENCOUNTER — TELEPHONE (OUTPATIENT)
Dept: PHYSICAL THERAPY | Facility: REHABILITATION | Age: 73
End: 2023-03-10

## 2023-03-17 ENCOUNTER — HOSPITAL ENCOUNTER (OUTPATIENT)
Dept: PHYSICAL THERAPY | Facility: REHABILITATION | Age: 73
Discharge: HOME | End: 2023-03-17
Payer: COMMERCIAL

## 2023-03-17 DIAGNOSIS — R29.6 RECURRENT FALLS: ICD-10-CM

## 2023-03-17 DIAGNOSIS — G63 POLYNEUROPATHY ASSOCIATED WITH UNDERLYING DISEASE (H): Primary | ICD-10-CM

## 2023-03-17 PROCEDURE — 97110 THERAPEUTIC EXERCISES: CPT | Mod: GP | Performed by: PHYSICAL THERAPIST

## 2023-03-17 PROCEDURE — 97112 NEUROMUSCULAR REEDUCATION: CPT | Mod: GP | Performed by: PHYSICAL THERAPIST

## 2023-03-26 ENCOUNTER — HEALTH MAINTENANCE LETTER (OUTPATIENT)
Age: 73
End: 2023-03-26

## 2023-04-11 ENCOUNTER — TRANSFERRED RECORDS (OUTPATIENT)
Dept: MULTI SPECIALTY CLINIC | Facility: CLINIC | Age: 73
End: 2023-04-11

## 2023-04-11 LAB — RETINOPATHY: POSITIVE

## 2023-05-16 ENCOUNTER — VIRTUAL VISIT (OUTPATIENT)
Dept: INTERNAL MEDICINE | Facility: CLINIC | Age: 73
End: 2023-05-16
Payer: COMMERCIAL

## 2023-05-16 DIAGNOSIS — G25.81 RESTLESS LEGS SYNDROME: ICD-10-CM

## 2023-05-16 DIAGNOSIS — G63 POLYNEUROPATHY ASSOCIATED WITH UNDERLYING DISEASE (H): ICD-10-CM

## 2023-05-16 DIAGNOSIS — E78.2 MIXED HYPERLIPIDEMIA: ICD-10-CM

## 2023-05-16 DIAGNOSIS — Z00.00 ENCOUNTER FOR MEDICARE ANNUAL WELLNESS EXAM: Primary | ICD-10-CM

## 2023-05-16 DIAGNOSIS — E53.8 B12 DEFICIENCY: ICD-10-CM

## 2023-05-16 DIAGNOSIS — Z12.5 SCREENING FOR PROSTATE CANCER: ICD-10-CM

## 2023-05-16 DIAGNOSIS — Z79.4 TYPE 2 DIABETES MELLITUS WITH RETINOPATHY, WITH LONG-TERM CURRENT USE OF INSULIN, MACULAR EDEMA PRESENCE UNSPECIFIED, UNSPECIFIED LATERALITY, UNSPECIFIED RETINOPATHY SEVERITY (H): ICD-10-CM

## 2023-05-16 DIAGNOSIS — G45.9 TIA (TRANSIENT ISCHEMIC ATTACK): ICD-10-CM

## 2023-05-16 DIAGNOSIS — E11.319 TYPE 2 DIABETES MELLITUS WITH RETINOPATHY, WITH LONG-TERM CURRENT USE OF INSULIN, MACULAR EDEMA PRESENCE UNSPECIFIED, UNSPECIFIED LATERALITY, UNSPECIFIED RETINOPATHY SEVERITY (H): ICD-10-CM

## 2023-05-16 DIAGNOSIS — I10 ESSENTIAL HYPERTENSION: ICD-10-CM

## 2023-05-16 DIAGNOSIS — E11.3299 NONPROLIFERATIVE DIABETIC RETINOPATHY (H): ICD-10-CM

## 2023-05-16 DIAGNOSIS — Z85.51 HISTORY OF BLADDER CANCER: ICD-10-CM

## 2023-05-16 PROCEDURE — G0439 PPPS, SUBSEQ VISIT: HCPCS | Mod: VID | Performed by: INTERNAL MEDICINE

## 2023-05-16 PROCEDURE — 99214 OFFICE O/P EST MOD 30 MIN: CPT | Mod: VID | Performed by: INTERNAL MEDICINE

## 2023-05-16 RX ORDER — ASPIRIN 81 MG/1
81 TABLET ORAL DAILY
COMMUNITY

## 2023-05-16 ASSESSMENT — ENCOUNTER SYMPTOMS
HEADACHES: 0
EYE PAIN: 0
SORE THROAT: 0
PALPITATIONS: 0
DYSURIA: 0
NAUSEA: 0
FEVER: 0
WEAKNESS: 0
ABDOMINAL PAIN: 0
CHILLS: 0
DIARRHEA: 0
COUGH: 0
ARTHRALGIAS: 0
NERVOUS/ANXIOUS: 0
CONSTIPATION: 0
HEARTBURN: 0
HEMATURIA: 0
MYALGIAS: 1
PARESTHESIAS: 0
FREQUENCY: 0
HEMATOCHEZIA: 0
JOINT SWELLING: 0
DIZZINESS: 0
SHORTNESS OF BREATH: 0

## 2023-05-16 ASSESSMENT — ACTIVITIES OF DAILY LIVING (ADL): CURRENT_FUNCTION: NO ASSISTANCE NEEDED

## 2023-05-16 NOTE — PROGRESS NOTES
"SUBJECTIVE:   Basim is a 72 year old who presents for Preventive Visit.       View : No data to display.            Patient has been advised of split billing requirements and indicates understanding: Yes  Are you in the first 12 months of your Medicare coverage?  No    Healthy Habits:     In general, how would you rate your overall health?  Fair    Frequency of exercise:  None    Do you usually eat at least 4 servings of fruit and vegetables a day, include whole grains    & fiber and avoid regularly eating high fat or \"junk\" foods?  No    Taking medications regularly:  Yes    Medication side effects:  None    Ability to successfully perform activities of daily living:  No assistance needed    Home Safety:  No safety concerns identified    Hearing Impairment:  No hearing concerns    In the past 6 months, have you been bothered by leaking of urine?  No    In general, how would you rate your overall mental or emotional health?  Good      PHQ-2 Total Score: 0    Additional concerns today:  No    Basim comes in today via video for annual wellness.  He struggles with his weight.  Has a hard time with food addiction.  He does not know what his sugars have been running.  He does not check them if he knows they are high.  He has a new endocrinologist but they have not done the lab work either.  He actually missed an appointment with a new endocrinologist and now is going to get someone else new.  Difficult situation.  He spends time between here and [place].  He is not terribly active with exercise.  He is actively seeing friends and he has been buying a lot of things lately including TVs and art work.  Feels good about that.  Mood has been okay.  He has not been ill.  No further falls since I saw him last.  Therapy was a good thing for him he states.  He does use a cane on occasion.  He did see the neurologist for his TIA.  They told him to take his aspirin and he believes he is doing that but is not entirely clear.  No further " neurologic events.    Have you ever done Advance Care Planning? (For example, a Health Directive, POLST, or a discussion with a medical provider or your loved ones about your wishes): Yes, patient states has an Advance Care Planning document and will bring a copy to the clinic.       Fall risk  Fallen 2 or more times in the past year?: No  Any fall with injury in the past year?: No    Cognitive Screening Unable to complete due to virtual visit; need for additional assessment in future face-to-face visit    Do you have sleep apnea, excessive snoring or daytime drowsiness?: no    Reviewed and updated as needed this visit by clinical staff   Tobacco  Allergies  Meds              Reviewed and updated as needed this visit by Provider                 Social History     Tobacco Use     Smoking status: Former     Types: Cigarettes     Quit date: 8/3/1980     Years since quittin.8     Smokeless tobacco: Never   Vaping Use     Vaping status: Never Used   Substance Use Topics     Alcohol use: No             2023     4:52 PM   Alcohol Use   Prescreen: >3 drinks/day or >7 drinks/week? No     Do you have a current opioid prescription? No  Do you use any other controlled substances or medications that are not prescribed by a provider? None              Current providers sharing in care for this patient include:   Patient Care Team:  Heraclio Meng MD as PCP - General (Internal Medicine)  Nestor Candelario MD as MD (Internal Medicine)  Camden Hyde MD as MD (Neurology)  Merrick Rodriguez MD as MD (Urology)  Heraclio Meng MD as Assigned PCP  Wendy Torres APRN CNP as Nurse Practitioner (Neurology)  Wendy Torres APRN CNP as Nurse Practitioner (Neurology)    The following health maintenance items are reviewed in Epic and correct as of today:  Health Maintenance   Topic Date Due     EYE EXAM  2022     COVID-19 Vaccine (7 - Moderna series) 2023     A1C  2023     LIPID  2023      "DIABETIC FOOT EXAM  05/09/2023     MEDICARE ANNUAL WELLNESS VISIT  05/09/2023     ANNUAL REVIEW OF HM ORDERS  11/03/2023     BMP  11/13/2023     FALL RISK ASSESSMENT  05/16/2024     ADVANCE CARE PLANNING  05/09/2027     DTAP/TDAP/TD IMMUNIZATION (3 - Td or Tdap) 01/31/2030     COLORECTAL CANCER SCREENING  08/24/2031     HEPATITIS C SCREENING  Completed     PHQ-2 (once per calendar year)  Completed     INFLUENZA VACCINE  Completed     Pneumococcal Vaccine: 65+ Years  Completed     ZOSTER IMMUNIZATION  Completed     AORTIC ANEURYSM SCREENING (SYSTEM ASSIGNED)  Completed     IPV IMMUNIZATION  Aged Out     MENINGITIS IMMUNIZATION  Aged Out     MICROALBUMIN  Discontinued               Review of Systems   Constitutional: Negative for chills and fever.   HENT: Negative for congestion, ear pain, hearing loss and sore throat.    Eyes: Negative for pain and visual disturbance.   Respiratory: Negative for cough and shortness of breath.    Cardiovascular: Negative for chest pain, palpitations and peripheral edema.   Gastrointestinal: Negative for abdominal pain, constipation, diarrhea, heartburn, hematochezia and nausea.   Genitourinary: Negative for dysuria, frequency, hematuria and urgency.   Musculoskeletal: Positive for myalgias. Negative for arthralgias and joint swelling.   Skin: Negative for rash.   Neurological: Negative for dizziness, weakness, headaches and paresthesias.   Psychiatric/Behavioral: Negative for mood changes. The patient is not nervous/anxious.          OBJECTIVE:   There were no vitals taken for this visit. Estimated body mass index is 34.64 kg/m  as calculated from the following:    Height as of 11/3/22: 1.765 m (5' 9.5\").    Weight as of 11/13/22: 108 kg (238 lb).  Physical Exam     Virtual visit -- exam unable to complete.        ASSESSMENT / PLAN:   1. Encounter for Medicare annual wellness exam  Patient really needs to have an in office visit as he is overdue for a lot of things such as diabetic " foot exams etc. lab work etc.  He will return for labs and he will return for it in office visit as well.  I have urged him remain as active as he can.  We will get him set up for PSA  - PRIMARY CARE FOLLOW-UP SCHEDULING; Future    2. Type 2 diabetes mellitus with retinopathy, with long-term current use of insulin, macular edema presence unspecified, unspecified laterality, unspecified retinopathy severity (H)  Unknown control.  He has a new endocrinologist.  Has not had any lab work for monitoring in many months.  I have urged him to return for that  - Lipid panel reflex to direct LDL Fasting; Future  - Comprehensive metabolic panel (BMP + Alb, Alk Phos, ALT, AST, Total. Bili, TP); Future  - UA Macroscopic with reflex to Microscopic and Culture; Future  - Hemoglobin A1c; Future  - Albumin Random Urine Quantitative with Creat Ratio; Future    3. Nonproliferative diabetic retinopathy (H)  We need to get his most recent eye exam in the chart    4. Polyneuropathy associated with underlying disease (H)  Stable.  Continue gabapentin    5. TIA (transient ischemic attack)  He believes he is taking his aspirin daily.  I urged him to start if he has not    6. Restless legs syndrome  Continue his current ropinirole    7. Mixed hyperlipidemia  He will return for lipids on his statin.    8. Essential hypertension  He needs a blood pressure check.  He will return for that    9. History of bladder cancer  He is getting his yearly surveillance cystoscopies    10. B12 deficiency  Continue B12 IM.  Check blood counts soon  - CBC with platelets; Future    11. Screening for prostate cancer  He will return for lab work including his PSA  - PSA, screen; Future    Patient has been advised of split billing requirements and indicates understanding: Yes      COUNSELING:  Reviewed preventive health counseling, as reflected in patient instructions      BMI:   Estimated body mass index is 34.64 kg/m  as calculated from the following:    Height  "as of 11/3/22: 1.765 m (5' 9.5\").    Weight as of 11/13/22: 108 kg (238 lb).         He reports that he quit smoking about 42 years ago. His smoking use included cigarettes. He has never used smokeless tobacco.      Appropriate preventive services were discussed with this patient, including applicable screening as appropriate for cardiovascular disease, diabetes, osteopenia/osteoporosis, and glaucoma.  As appropriate for age/gender, discussed screening for colorectal cancer, prostate cancer, breast cancer, and cervical cancer. Checklist reviewing preventive services available has been given to the patient.    Reviewed patients plan of care and provided an AVS. The Basic Care Plan (routine screening as documented in Health Maintenance) for Efren meets the Care Plan requirement. This Care Plan has been established and reviewed with the Patient.      SILVIANO JAVIER MD  [facility]    Identified Health Risks:    I have reviewed Opioid Use Disorder and Substance Use Disorder risk factors and made any needed referrals.       The patient was provided with suggestions to help him develop a healthy physical lifestyle.  He is at risk for lack of exercise and has been provided with information to increase physical activity for the benefit of his well-being.  The patient was counseled and encouraged to consider modifying their diet and eating habits. He was provided with information on recommended healthy diet options.Complex Care Plan (for patients with higher acuity and needing more deliberate coordination of services)    Patient was in his home in [place].  I was onsite in the clinic.  Start time of video was 1721.  End time of video was 1736  "

## 2023-05-16 NOTE — PATIENT INSTRUCTIONS
Patient Education   Personalized Prevention Plan  You are due for the preventive services outlined below.  Your care team is available to assist you in scheduling these services.  If you have already completed any of these items, please share that information with your care team to update in your medical record.  Health Maintenance Due   Topic Date Due     Eye Exam  12/21/2022     COVID-19 Vaccine (7 - Moderna series) 02/04/2023     A1C Lab  02/08/2023     Cholesterol Lab  05/09/2023     Diabetic Foot Exam  05/09/2023     Annual Wellness Visit  05/09/2023     Your Health Risk Assessment indicates you feel you are not in good health    A healthy lifestyle helps keep the body fit and the mind alert. It helps protect you from disease, helps you fight disease, and helps prevent chronic disease (disease that doesn't go away) from getting worse. This is important as you get older and begin to notice twinges in muscles and joints and a decline in the strength and stamina you once took for granted. A healthy lifestyle includes good healthcare, good nutrition, weight control, recreation, and regular exercise. Avoid harmful substances and do what you can to keep safe. Another part of a healthy lifestyle is stay mentally active and socially involved.    Good healthcare     Have a wellness visit every year.     If you have new symptoms, let us know right away. Don't wait until the next checkup.     Take medicines exactly as prescribed and keep your medicines in a safe place. Tell us if your medicine causes problems.   Healthy diet and weight control     Eat 3 or 4 small, nutritious, low-fat, high-fiber meals a day. Include a variety of fruits, vegetables, and whole-grain foods.     Make sure you get enough calcium in your diet. Calcium, vitamin D, and exercise help prevent osteoporosis (bone thinning).     If you live alone, try eating with others when you can. That way you get a good meal and have company while you eat it.      Try to keep a healthy weight. If you eat more calories than your body uses for energy, it will be stored as fat and you will gain weight.     Recreation   Recreation is not limited to sports and team events. It includes any activity that provides relaxation, interest, enjoyment, and exercise. Recreation provides an outlet for physical, mental, and social energy. It can give a sense of worth and achievement. It can help you stay healthy.    Mental Exercise and Social Involvement  Mental and emotional health is as important as physical health. Keep in touch with friends and family. Stay as active as possible. Continue to learn and challenge yourself.   Things you can do to stay mentally active are:    Learn something new, like a foreign language or musical instrument.     Play SCRABBLE or do crossword puzzles. If you cannot find people to play these games with you at home, you can play them with others on your computer through the Internet.     Join a games club--anything from card games to chess or checkers or lawn bowling.     Start a new hobby.     Go back to school.     Volunteer.     Read.   Keep up with world events.    Exercise for a Healthier Heart  You may wonder how you can improve the health of your heart. If you re thinking about exercise, you re on the right track. You don t need to become an athlete. But you do need a certain amount of brisk exercise to help strengthen your heart. If you have been diagnosed with a heart condition, your healthcare provider may advise exercise to help your condition. To help make exercise a habit, choose safe, fun activities.      Exercise with a friend. When activity is fun, you're more likely to stick with it.     Before you start  Check with your healthcare provider before starting an exercise program. This is especially important if you haven't been active for a while. It's also important if you have a long-term (chronic) health problem such as heart disease,  diabetes, or obesity. Also check with your provider if you're at high risk for having these problems.   Why exercise?  Exercising regularly offers many healthy rewards. It can help you do all of these:     Improve your blood cholesterol level to help prevent further heart trouble.    Lower your blood pressure to help prevent a stroke or heart attack.    Control diabetes or reduce your risk of getting this disease.    Improve your heart and lung function.    Reach and stay at a healthy weight.    Make your muscles stronger so you can stay active.    Prevent falls and fractures by slowing the loss of bone mass (osteoporosis).    Manage stress better.    Improve your sense of self and your body image.  Exercise tips      Ease into your routine. Set small goals. Then build on them. Talk with your healthcare provider first before starting an exercise routine if you're not sure what your activity level should be.    Exercise on most days. Aim for a total of at least 150 minutes (2 hours and 30 minutes) or more of moderate-intensity aerobic activity each week. You could also do 75 minutes (1 hour and 15 minutes) or more of vigorous-intensity aerobic activity each week. Or try for a combination of both. Moderate activity means that you breathe heavier and your heart rate increases, but you can still talk. Think about doing at least 30 minutes of moderate exercise, 5 times a week. It's OK to work up to the 30-minute period over time. Examples of moderate-intensity activity are brisk walking, gardening, and water aerobics.    Step up your daily activity level.  Along with your exercise program, try being more active the whole day. Walk instead of drive. Or park further away so that you take more steps each day. Do more household tasks or yard work. You may not be able to meet the advised amount of physical activity. But doing some moderate- or vigorous-intensity aerobic activity can help reduce your risk for heart disease.  Your healthcare provider can help you figure out what is best for you.    Choose 1 or more activities you enjoy.  Walking is one of the easiest things you can do. You can also try swimming, riding a bike, dancing, or taking an exercise class.    Call 911  Call 911 right away if any of these occur:     Chest pain that doesn't go away quickly with rest    New burning, tightness, pressure, or heaviness in your chest, neck, shoulders, back, or arms    Abnormal or severe shortness of breath    A very fast or irregular heartbeat (palpitations)    Fainting  When to call your healthcare provider  Call your healthcare provider if you have any of these:     Dizziness or lightheadedness    Mild shortness of breath or chest pain    Increased or new joint or muscle pain    [employer] last reviewed this educational content on 7/1/2022 2000-2022 [employer]. All rights reserved. This information is not intended as a substitute for professional medical care. Always follow your healthcare professional's instructions.          Understanding USDA MyPlate  The USDA has guidelines to help you make healthy food choices. These are called MyPlate. MyPlate shows the food groups that make up healthy meals using the image of a place setting. Before you eat, think about the healthiest choices for what to put on your plate or in your cup or bowl. To learn more about building a healthy plate, visit www.choosemyplate.gov.     The food groups    Fruits. Any fruit or 100% fruit juice counts as part of the Fruit Group. Fruits may be fresh, canned, frozen, or dried, and may be whole, cut-up, or pureed. Make 1/2 of your plate fruits and vegetables.    Vegetables. Any vegetable or 100% vegetable juice counts as a member of the Vegetable Group. Vegetables may be fresh, frozen, canned, or dried. They can be served raw or cooked and may be whole, cut-up, or mashed. Make 1/2 of your plate fruits and vegetables.    Grains. All foods made from  grains are part of the Grains Group. These include wheat, rice, oats, cornmeal, and barley. Grains are often used to make foods such as bread, pasta, oatmeal, cereal, tortillas, and grits. Grains should be no more than 1/4 of your plate. At least half of your grains should be whole grains.    Protein. This group includes meat, poultry, seafood, beans and peas, eggs, processed soy products (such as tofu), nuts (including nut butters), and seeds. Make protein choices no more than 1/4 of your plate. Meat and poultry choices should be lean or low fat.    Dairy. The Dairy Group includes all fluid milk products and foods made from milk that contain calcium, such as yogurt and cheese. (Foods that have little calcium, such as cream, butter, and cream cheese, are not part of this group.) Most dairy choices should be low-fat or fat-free.    Oils. Oils aren't a food group, but they do contain essential nutrients. However it's important to watch your intake of oils. These are fats that are liquid at room temperature. They include canola, corn, olive, soybean, vegetable, and sunflower oil. Foods that are mainly oil include mayonnaise, certain salad dressings, and soft margarines. You likely already get your daily oil allowance from the foods you eat.  Things to limit  Eating healthy also means limiting these things in your diet:    Salt (sodium). Many processed foods have a lot of sodium. To keep sodium intake down, eat fresh vegetables, meats, poultry, and seafood when possible. Purchase low-sodium, reduced-sodium, or no-salt-added food products at the store. And don't add salt to your meals at home. Instead, season them with herbs and spices such as dill, oregano, cumin, and paprika. Or try adding flavor with lemon or lime zest and juice.    Saturated fat. Saturated fats are most often found in animal products such as beef, pork, and chicken. They are often solid at room temperature, such as butter. To reduce your saturated  fat intake, choose leaner cuts of meat and poultry. And try healthier cooking methods such as grilling, broiling, roasting, or baking. For a simple lower-fat swap, use plain nonfat yogurt instead of mayonnaise when making potato salad or macaroni salad.    Added sugars. These are sugars added to foods. They are in foods such as ice cream, candy, soda, fruit drinks, sports drinks, energy drinks, cookies, pastries, jams, and syrups. Cut down on added sugars by sharing sweet treats with a family member or friend. You can also choose fruit for dessert, and drink water or other unsweetened beverages.  [employer] last reviewed this educational content on 6/1/2020 2000-2022 [employer]. All rights reserved. This information is not intended as a substitute for professional medical care. Always follow your healthcare professional's instructions.

## 2023-05-16 NOTE — Clinical Note
Please contact patient to schedule lab work at his soonest convenience at a location close to home.  Please schedule him an in person visit with me in the next 3 months.

## 2023-05-16 NOTE — Clinical Note
Patient believes he is up-to-date on his eye exam.  Please contact his eye provider and get the results for the chart and update the health maintenance.  thank you.

## 2023-05-18 ENCOUNTER — TELEPHONE (OUTPATIENT)
Dept: INTERNAL MEDICINE | Facility: CLINIC | Age: 73
End: 2023-05-18
Payer: COMMERCIAL

## 2023-05-25 ENCOUNTER — LAB (OUTPATIENT)
Dept: LAB | Facility: CLINIC | Age: 73
End: 2023-05-25
Payer: COMMERCIAL

## 2023-05-25 DIAGNOSIS — E53.8 B12 DEFICIENCY: ICD-10-CM

## 2023-05-25 DIAGNOSIS — Z79.4 TYPE 2 DIABETES MELLITUS WITH RETINOPATHY, WITH LONG-TERM CURRENT USE OF INSULIN, MACULAR EDEMA PRESENCE UNSPECIFIED, UNSPECIFIED LATERALITY, UNSPECIFIED RETINOPATHY SEVERITY (H): ICD-10-CM

## 2023-05-25 DIAGNOSIS — E11.319 TYPE 2 DIABETES MELLITUS WITH RETINOPATHY, WITH LONG-TERM CURRENT USE OF INSULIN, MACULAR EDEMA PRESENCE UNSPECIFIED, UNSPECIFIED LATERALITY, UNSPECIFIED RETINOPATHY SEVERITY (H): ICD-10-CM

## 2023-05-25 DIAGNOSIS — Z12.5 SCREENING FOR PROSTATE CANCER: ICD-10-CM

## 2023-05-25 LAB
ALBUMIN SERPL BCG-MCNC: 4.4 G/DL (ref 3.5–5.2)
ALBUMIN UR-MCNC: ABNORMAL MG/DL
ALP SERPL-CCNC: 144 U/L (ref 40–129)
ALT SERPL W P-5'-P-CCNC: 22 U/L (ref 10–50)
ANION GAP SERPL CALCULATED.3IONS-SCNC: 11 MMOL/L (ref 7–15)
APPEARANCE UR: CLEAR
AST SERPL W P-5'-P-CCNC: 22 U/L (ref 10–50)
BACTERIA #/AREA URNS HPF: ABNORMAL /HPF
BILIRUB SERPL-MCNC: 0.5 MG/DL
BILIRUB UR QL STRIP: NEGATIVE
BUN SERPL-MCNC: 18.1 MG/DL (ref 8–23)
CALCIUM SERPL-MCNC: 9.2 MG/DL (ref 8.8–10.2)
CHLORIDE SERPL-SCNC: 101 MMOL/L (ref 98–107)
CHOLEST SERPL-MCNC: 114 MG/DL
COLOR UR AUTO: YELLOW
CREAT SERPL-MCNC: 0.91 MG/DL (ref 0.67–1.17)
CREAT UR-MCNC: 119 MG/DL
DEPRECATED HCO3 PLAS-SCNC: 27 MMOL/L (ref 22–29)
ERYTHROCYTE [DISTWIDTH] IN BLOOD BY AUTOMATED COUNT: 13.7 % (ref 10–15)
GFR SERPL CREATININE-BSD FRML MDRD: 89 ML/MIN/1.73M2
GLUCOSE SERPL-MCNC: 267 MG/DL (ref 70–99)
GLUCOSE UR STRIP-MCNC: 500 MG/DL
HBA1C MFR BLD: 8.8 % (ref 0–5.6)
HCT VFR BLD AUTO: 43 % (ref 40–53)
HDLC SERPL-MCNC: 52 MG/DL
HGB BLD-MCNC: 13.8 G/DL (ref 13.3–17.7)
HGB UR QL STRIP: NEGATIVE
HYALINE CASTS #/AREA URNS LPF: ABNORMAL /LPF
KETONES UR STRIP-MCNC: NEGATIVE MG/DL
LDLC SERPL CALC-MCNC: 46 MG/DL
LEUKOCYTE ESTERASE UR QL STRIP: NEGATIVE
MCH RBC QN AUTO: 26.2 PG (ref 26.5–33)
MCHC RBC AUTO-ENTMCNC: 32.1 G/DL (ref 31.5–36.5)
MCV RBC AUTO: 82 FL (ref 78–100)
MICROALBUMIN UR-MCNC: 26.7 MG/L
MICROALBUMIN/CREAT UR: 22.44 MG/G CR (ref 0–17)
MUCOUS THREADS #/AREA URNS LPF: PRESENT /LPF
NITRATE UR QL: NEGATIVE
NONHDLC SERPL-MCNC: 62 MG/DL
PH UR STRIP: 5 [PH] (ref 5–8)
PLATELET # BLD AUTO: 204 10E3/UL (ref 150–450)
POTASSIUM SERPL-SCNC: 4.1 MMOL/L (ref 3.4–5.3)
PROT SERPL-MCNC: 7.5 G/DL (ref 6.4–8.3)
PSA SERPL DL<=0.01 NG/ML-MCNC: 1.84 NG/ML (ref 0–6.5)
RBC # BLD AUTO: 5.26 10E6/UL (ref 4.4–5.9)
RBC #/AREA URNS AUTO: ABNORMAL /HPF
SODIUM SERPL-SCNC: 139 MMOL/L (ref 136–145)
SP GR UR STRIP: 1.02 (ref 1–1.03)
TRIGL SERPL-MCNC: 79 MG/DL
UROBILINOGEN UR STRIP-ACNC: 0.2 E.U./DL
WBC # BLD AUTO: 7.5 10E3/UL (ref 4–11)
WBC #/AREA URNS AUTO: ABNORMAL /HPF

## 2023-05-25 PROCEDURE — 85027 COMPLETE CBC AUTOMATED: CPT

## 2023-05-25 PROCEDURE — 82043 UR ALBUMIN QUANTITATIVE: CPT

## 2023-05-25 PROCEDURE — 82570 ASSAY OF URINE CREATININE: CPT

## 2023-05-25 PROCEDURE — 36415 COLL VENOUS BLD VENIPUNCTURE: CPT

## 2023-05-25 PROCEDURE — 83036 HEMOGLOBIN GLYCOSYLATED A1C: CPT

## 2023-05-25 PROCEDURE — 81001 URINALYSIS AUTO W/SCOPE: CPT

## 2023-05-25 PROCEDURE — 80061 LIPID PANEL: CPT

## 2023-05-25 PROCEDURE — G0103 PSA SCREENING: HCPCS

## 2023-05-25 PROCEDURE — 80053 COMPREHEN METABOLIC PANEL: CPT

## 2023-06-05 NOTE — ADDENDUM NOTE
Encounter addended by: Kendall Olivera, PT on: 6/5/2023 9:32 AM   Actions taken: Episode resolved, Clinical Note Signed, Flowsheet accepted

## 2023-06-05 NOTE — PROGRESS NOTES
"    DISCHARGE  Reason for Discharge: Patient chooses to discontinue therapy.  Patient has failed to schedule further appointments.    Equipment Issued: NA    Discharge Plan: Patient to continue home program.    Referring Provider:  Heraclio Meng       03/17/23 1000   Appointment Info   Signing clinician's name / credentials Kendall Olivera, PT   Visits Used 9   Subjective Report   Subjective Report Basim is feeling better today.  He hasn't been exercising as much recently.  He plans on returning to the gym on a regular basis this coming week.   Objective Measures   Objective Measures Objective Measure 1;Objective Measure 2   Objective Measure 1   Objective Measure LEFS @ eval 27/80   Objective Measure 2   Objective Measure STS in 30s   Details 6   Treatment Interventions (PT)   Interventions Therapeutic Procedure/Exercise;Gait Training;Neuromuscular Re-education   Therapeutic Procedure/Exercise   Therapeutic Procedures: strength, endurance, ROM, flexibillity minutes (13971) 10   Skilled Intervention Continuation of HEP   Patient Response/Progress Improving   Neuromuscular Re-education   Neuromuscular re-ed of mvmt, balance, coord, kinesthetic sense, posture, proprioception minutes (87089) 30   Skilled Intervention Balance Training   Patient Response/Progress Fair   Treatment Detail Curb negotation training with 8\" step  and 6\" step- heavy cues for up with the R, down with the L, CGA/min A provided throughout for safety. four 6\" practice stairs  with 1 HR and SEC  x3 CGA/Min A   Plan   Homework HEP   Home program See PTRx   Plan for next session Monitor compliance/response to HEP; progressing strengthening and balance training   Comments   Comments Assessment: Patient is making progress with physical therapy and he has improved his compliance with HEP. He is still having difficulty with balance, especially with curb and obstacle navigation. He is making improvements in quad strength, but is still having quad weakness " in deeper range of motion of knee. He will continue to benefit from PT.   Medicare Claim Information   Medical Diagnosis Polyneuropathy associated with underlying disease (H), Recurrent falls   PT Diagnosis recurrent falls   Start of Care Date 11/09/22   Onset date of current episode/exacerbation 11/03/22  (Date of PT order)   Certification date from 11/09/22   Ortho Goal 1   Goal Identifier HEP   Goal Description Patient will be independent with HEP and self management of symptoms   Goal Progress improving, continue goal   Target Date 04/08/23   Ortho Goal 2   Goal Identifier transfers   Goal Description Patient will perform 10 sit to stand transfers without UE in 30 seconds to demonstrate increased functional strength and decreased risk of falls   Goal Progress Improving; 6 in 30s on 2/17/23   Target Date 04/08/23   Ortho Goal 3   Goal Identifier curbs   Goal Description Patient will ascend/descend curbs independently or with modified indepdence with SEC safely without pain   Goal Progress Having difficulty, continue improving   Target Date 04/08/23

## 2023-07-09 ENCOUNTER — MYC MEDICAL ADVICE (OUTPATIENT)
Dept: INTERNAL MEDICINE | Facility: CLINIC | Age: 73
End: 2023-07-09
Payer: COMMERCIAL

## 2023-07-11 ENCOUNTER — OFFICE VISIT (OUTPATIENT)
Dept: INTERNAL MEDICINE | Facility: CLINIC | Age: 73
End: 2023-07-11
Payer: COMMERCIAL

## 2023-07-11 VITALS
RESPIRATION RATE: 18 BRPM | HEART RATE: 67 BPM | DIASTOLIC BLOOD PRESSURE: 62 MMHG | TEMPERATURE: 97.2 F | OXYGEN SATURATION: 96 % | BODY MASS INDEX: 32.63 KG/M2 | HEIGHT: 70 IN | WEIGHT: 227.9 LBS | SYSTOLIC BLOOD PRESSURE: 120 MMHG

## 2023-07-11 DIAGNOSIS — R21 RASH: Primary | ICD-10-CM

## 2023-07-11 PROCEDURE — 99213 OFFICE O/P EST LOW 20 MIN: CPT | Performed by: INTERNAL MEDICINE

## 2023-07-11 RX ORDER — CLOTRIMAZOLE 1 %
CREAM (GRAM) TOPICAL 2 TIMES DAILY
Qty: 30 G | Refills: 1 | Status: SHIPPED | OUTPATIENT
Start: 2023-07-11 | End: 2023-09-13

## 2023-07-11 NOTE — PROGRESS NOTES
"  1. Rash  I am not convinced that these are tinea infection.  Almost look like bug bites.  I can give him a refill of clotrimazole to have on hand should he get something that looks like tinea versicolor or similar.  He can try it on the area.  He can also use some hydrocortisone as needed for itching if he has that.  These are definitely not hives.  If he continues to have more of them or has more problems with them he is to let me know.  - clotrimazole (LOTRIMIN) 1 % external cream; Apply topically 2 times daily  Dispense: 30 g; Refill: 1    Subjective   Basim is a 72 year old, presenting for the following health issues:  Hives (Hives on various body parts X 3 weeks)        7/11/2023     2:56 PM   Additional Questions   Roomed by Ellen   Accompanied by N/A         7/11/2023     2:56 PM   Patient Reported Additional Medications   Patient reports taking the following new medications N/A     History of Present Illness       Reason for visit:  Hives    He eats 0-1 servings of fruits and vegetables daily.He consumes 0 sweetened beverage(s) daily.He exercises with enough effort to increase his heart rate 9 or less minutes per day.  He exercises with enough effort to increase his heart rate 4 days per week.   He is taking medications regularly.         Comes in as an urgent add-on for evaluation of a rash.  He wonders if it is hives.  He has 3 areas in various places.  1 on the leg one on his arm one on his hip.  Erythematous about 1 to 2 cm around.  He has a history of tinea infections and wonders if it is \"ringworm\".      Review of Systems         Objective    /62 (BP Location: Left arm, Patient Position: Sitting, Cuff Size: Adult Regular)   Pulse 67   Temp 97.2  F (36.2  C) (Tympanic)   Resp 18   Ht 1.778 m (5' 10\")   Wt 103.4 kg (227 lb 14.4 oz)   SpO2 96%   BMI 32.70 kg/m    Body mass index is 32.7 kg/m .  Physical Exam       Erythematous lesions of various intensity not raised and do not appear to be " tinea infections.  Almost look like bug bites.

## 2023-07-24 DIAGNOSIS — E11.9 TYPE 2 DIABETES MELLITUS WITHOUT COMPLICATIONS (H): ICD-10-CM

## 2023-07-24 DIAGNOSIS — Z79.4 TYPE 2 DIABETES MELLITUS WITHOUT COMPLICATION, WITH LONG-TERM CURRENT USE OF INSULIN (H): ICD-10-CM

## 2023-07-24 DIAGNOSIS — E11.9 TYPE 2 DIABETES MELLITUS WITHOUT COMPLICATION, WITH LONG-TERM CURRENT USE OF INSULIN (H): ICD-10-CM

## 2023-07-25 RX ORDER — GABAPENTIN 300 MG/1
CAPSULE ORAL
Qty: 360 CAPSULE | Refills: 3 | Status: SHIPPED | OUTPATIENT
Start: 2023-07-25 | End: 2024-05-20

## 2023-07-25 RX ORDER — LISINOPRIL AND HYDROCHLOROTHIAZIDE 12.5; 2 MG/1; MG/1
TABLET ORAL
Qty: 90 TABLET | Refills: 2 | Status: SHIPPED | OUTPATIENT
Start: 2023-07-25 | End: 2024-04-23

## 2023-07-25 NOTE — TELEPHONE ENCOUNTER
"gabapentin  Routing refill request to provider for review/approval because:  Drug not on the [facility] refill protocol     Last Written Prescription Date:  6/27/2022  Last Fill Quantity: 360,  # refills: 3   Last office visit provider:  7/11/2023     lisinopril  Last Written Prescription Date:  11/21/2022  Last Fill Quantity: 90,  # refills: 2   Last office visit provider:  7/11/2023     Requested Prescriptions   Pending Prescriptions Disp Refills    gabapentin (NEURONTIN) 300 MG capsule [Pharmacy Med Name: GABAPENTIN 300 MG CAPSULE] 360 capsule 3     Sig: TAKE 4 CAPSULES BY MOUTH NIGHTLY       There is no refill protocol information for this order       lisinopril-hydrochlorothiazide (ZESTORETIC) 20-12.5 MG tablet [Pharmacy Med Name: LISINOPRIL-HCTZ 20-12.5 MG TAB] 90 tablet 2     Sig: TAKE 1 TABLET BY MOUTH EVERY DAY       Diuretics (Including Combos) Protocol Passed - 7/24/2023  5:00 PM        Passed - Blood pressure under 140/90 in past 12 months     BP Readings from Last 3 Encounters:   07/11/23 120/62   11/13/22 (!) 150/75   11/03/22 124/68                 Passed - Recent (12 mo) or future (30 days) visit within the authorizing provider's specialty     Patient has had an office visit with the authorizing provider or a provider within the authorizing providers department within the previous 12 mos or has a future within next 30 days. See \"Patient Info\" tab in inbasket, or \"Choose Columns\" in Meds & Orders section of the refill encounter.              Passed - Medication is active on med list        Passed - Patient is age 18 or older        Passed - Normal serum creatinine on file in past 12 months     Recent Labs   Lab Test 05/25/23  0959   CR 0.91              Passed - Normal serum potassium on file in past 12 months     Recent Labs   Lab Test 05/25/23  0959   POTASSIUM 4.1                    Passed - Normal serum sodium on file in past 12 months     Recent Labs   Lab Test 05/25/23  0959                [facility] " "Inhibitors (Including Combos) Protocol Passed - 7/24/2023  5:00 PM        Passed - Blood pressure under 140/90 in past 12 months     BP Readings from Last 3 Encounters:   07/11/23 120/62   11/13/22 (!) 150/75   11/03/22 124/68                 Passed - Recent (12 mo) or future (30 days) visit within the authorizing provider's specialty     Patient has had an office visit with the authorizing provider or a provider within the authorizing providers department within the previous 12 mos or has a future within next 30 days. See \"Patient Info\" tab in inbasket, or \"Choose Columns\" in Meds & Orders section of the refill encounter.              Passed - Medication is active on med list        Passed - Patient is age 18 or older        Passed - Normal serum creatinine on file in past 12 months     Recent Labs   Lab Test 05/25/23  0959   CR 0.91       Ok to refill medication if creatinine is low          Passed - Normal serum potassium on file in past 12 months     Recent Labs   Lab Test 05/25/23  0959   POTASSIUM 4.1                  Liya Harding RN 07/25/23 4:47 AM  "

## 2023-07-27 ENCOUNTER — TRANSFERRED RECORDS (OUTPATIENT)
Dept: HEALTH INFORMATION MANAGEMENT | Facility: CLINIC | Age: 73
End: 2023-07-27
Payer: COMMERCIAL

## 2023-08-17 ENCOUNTER — OFFICE VISIT (OUTPATIENT)
Dept: INTERNAL MEDICINE | Facility: CLINIC | Age: 73
End: 2023-08-17
Payer: COMMERCIAL

## 2023-08-17 VITALS
TEMPERATURE: 97.4 F | WEIGHT: 227.4 LBS | DIASTOLIC BLOOD PRESSURE: 66 MMHG | SYSTOLIC BLOOD PRESSURE: 114 MMHG | HEART RATE: 65 BPM | BODY MASS INDEX: 32.56 KG/M2 | OXYGEN SATURATION: 97 % | RESPIRATION RATE: 15 BRPM | HEIGHT: 70 IN

## 2023-08-17 DIAGNOSIS — G63 POLYNEUROPATHY ASSOCIATED WITH UNDERLYING DISEASE (H): ICD-10-CM

## 2023-08-17 DIAGNOSIS — Z85.51 HISTORY OF BLADDER CANCER: ICD-10-CM

## 2023-08-17 DIAGNOSIS — Z79.4 TYPE 2 DIABETES MELLITUS WITH RETINOPATHY, WITH LONG-TERM CURRENT USE OF INSULIN, MACULAR EDEMA PRESENCE UNSPECIFIED, UNSPECIFIED LATERALITY, UNSPECIFIED RETINOPATHY SEVERITY (H): Primary | ICD-10-CM

## 2023-08-17 DIAGNOSIS — E11.319 TYPE 2 DIABETES MELLITUS WITH RETINOPATHY, WITH LONG-TERM CURRENT USE OF INSULIN, MACULAR EDEMA PRESENCE UNSPECIFIED, UNSPECIFIED LATERALITY, UNSPECIFIED RETINOPATHY SEVERITY (H): Primary | ICD-10-CM

## 2023-08-17 LAB
ALBUMIN SERPL BCG-MCNC: 4.3 G/DL (ref 3.5–5.2)
ALP SERPL-CCNC: 107 U/L (ref 40–129)
ALT SERPL W P-5'-P-CCNC: 16 U/L (ref 0–70)
ANION GAP SERPL CALCULATED.3IONS-SCNC: 11 MMOL/L (ref 7–15)
AST SERPL W P-5'-P-CCNC: 29 U/L (ref 0–45)
BILIRUB SERPL-MCNC: 0.7 MG/DL
BUN SERPL-MCNC: 13.4 MG/DL (ref 8–23)
CALCIUM SERPL-MCNC: 9.4 MG/DL (ref 8.8–10.2)
CHLORIDE SERPL-SCNC: 102 MMOL/L (ref 98–107)
CREAT SERPL-MCNC: 0.99 MG/DL (ref 0.67–1.17)
DEPRECATED HCO3 PLAS-SCNC: 25 MMOL/L (ref 22–29)
GFR SERPL CREATININE-BSD FRML MDRD: 80 ML/MIN/1.73M2
GLUCOSE SERPL-MCNC: 125 MG/DL (ref 70–99)
HBA1C MFR BLD: 7.7 % (ref 0–5.6)
POTASSIUM SERPL-SCNC: 4.5 MMOL/L (ref 3.4–5.3)
PROT SERPL-MCNC: 7.2 G/DL (ref 6.4–8.3)
SODIUM SERPL-SCNC: 138 MMOL/L (ref 136–145)

## 2023-08-17 PROCEDURE — 99214 OFFICE O/P EST MOD 30 MIN: CPT | Performed by: INTERNAL MEDICINE

## 2023-08-17 PROCEDURE — 36415 COLL VENOUS BLD VENIPUNCTURE: CPT | Performed by: INTERNAL MEDICINE

## 2023-08-17 PROCEDURE — 83036 HEMOGLOBIN GLYCOSYLATED A1C: CPT | Performed by: INTERNAL MEDICINE

## 2023-08-17 PROCEDURE — 80053 COMPREHEN METABOLIC PANEL: CPT | Performed by: INTERNAL MEDICINE

## 2023-08-17 RX ORDER — DIAZEPAM 2 MG
TABLET ORAL
COMMUNITY
Start: 2022-12-05 | End: 2023-08-17

## 2023-08-17 RX ORDER — TIRZEPATIDE 2.5 MG/.5ML
INJECTION, SOLUTION SUBCUTANEOUS
COMMUNITY
End: 2024-05-20

## 2023-08-17 ASSESSMENT — PAIN SCALES - GENERAL: PAINLEVEL: NO PAIN (0)

## 2023-08-17 NOTE — PROGRESS NOTES
1. Type 2 diabetes mellitus with retinopathy, with long-term current use of insulin, macular edema presence unspecified, unspecified laterality, unspecified retinopathy severity (H)  I am excited that he is on Mounjaro.  Labs for monitoring.  I have asked that he let me know in a month how things are going.  He will be heading to [place] for the winter.  I will see him in the spring when he returns for his annual.  - HEMOGLOBIN A1C; Future  - Comprehensive metabolic panel (BMP + Alb, Alk Phos, ALT, AST, Total. Bili, TP); Future    2. Polyneuropathy associated with underlying disease (H)  Continue good foot care.  No ulcers today.  Continue gabapentin for discomfort.    3. History of bladder cancer  See urology as planned.    I offered COVID-vaccine today.  He declines.  He wants to wait until the new formulation is available.    Subjective   Basim is a 72 year old, presenting for the following health issues:  Follow Up (3 month follow up . )        8/17/2023    12:22 PM   Additional Questions   Roomed by Nanci       History of Present Illness       Diabetes:   He presents for follow up of diabetes.  He is checking home blood glucose a few times a month.   He checks blood glucose before meals.  Blood glucose is sometimes over 200 and sometimes under 70. He is aware of hypoglycemia symptoms including shakiness.   He is concerned about blood sugar frequently over 200.   He is having numbness in feet and burning in feet.            He eats 0-1 servings of fruits and vegetables daily.He consumes 0 sweetened beverage(s) daily.He exercises with enough effort to increase his heart rate 9 or less minutes per day.  He exercises with enough effort to increase his heart rate 3 or less days per week.   He is taking medications regularly.       Basim comes in today for follow-up.  He reports things have been going relatively well.  He is now on Mounjaro.  Looking forward to getting started on that.  He is hoping that that is going  "to help him lose weight.  He has issues with food addiction and binge eating.  He has diabetes which has been subadequately controlled.  He is going to be meeting with a new endocrinologist this fall.  His neuropathy has been stable.  He will be seeing his urologist this fall for his yearly cystoscopy.  He has a remote history of bladder cancer.        Review of Systems         Objective    /66 (BP Location: Left arm, Patient Position: Sitting, Cuff Size: Adult Regular)   Pulse 65   Temp 97.4  F (36.3  C) (Tympanic)   Resp 15   Ht 1.778 m (5' 10\")   Wt 103.1 kg (227 lb 6.4 oz)   SpO2 97%   BMI 32.63 kg/m    Body mass index is 32.63 kg/m .  Physical Exam   GENERAL: alert and no distress  MS: no gross musculoskeletal defects noted, no edema  Diabetic foot exam: normal DP and PT pulses, no trophic changes or ulcerative lesions, and reduced sensation at all sites below ankle.                      "

## 2023-09-13 DIAGNOSIS — Z79.4 TYPE 2 DIABETES MELLITUS WITHOUT COMPLICATION, WITH LONG-TERM CURRENT USE OF INSULIN (H): ICD-10-CM

## 2023-09-13 DIAGNOSIS — R21 RASH: ICD-10-CM

## 2023-09-13 DIAGNOSIS — E11.9 TYPE 2 DIABETES MELLITUS WITHOUT COMPLICATION, WITH LONG-TERM CURRENT USE OF INSULIN (H): ICD-10-CM

## 2023-09-13 RX ORDER — CLOTRIMAZOLE 1 %
CREAM (GRAM) TOPICAL 2 TIMES DAILY
Qty: 30 G | Refills: 1 | Status: SHIPPED | OUTPATIENT
Start: 2023-09-13 | End: 2023-12-06

## 2023-09-13 RX ORDER — INSULIN DEGLUDEC 200 U/ML
INJECTION, SOLUTION SUBCUTANEOUS
Qty: 18 ML | Refills: 1 | Status: SHIPPED | OUTPATIENT
Start: 2023-09-13

## 2023-09-13 NOTE — TELEPHONE ENCOUNTER
Prescription approved per [facility] Refill Protocol.  Debbie Candelario, RN  [facility] Triage Nurse

## 2023-09-14 ENCOUNTER — TRANSFERRED RECORDS (OUTPATIENT)
Dept: HEALTH INFORMATION MANAGEMENT | Facility: CLINIC | Age: 73
End: 2023-09-14
Payer: COMMERCIAL

## 2023-10-27 ENCOUNTER — TRANSFERRED RECORDS (OUTPATIENT)
Dept: HEALTH INFORMATION MANAGEMENT | Facility: CLINIC | Age: 73
End: 2023-10-27
Payer: COMMERCIAL

## 2023-11-06 ENCOUNTER — TRANSFERRED RECORDS (OUTPATIENT)
Dept: HEALTH INFORMATION MANAGEMENT | Facility: CLINIC | Age: 73
End: 2023-11-06
Payer: COMMERCIAL

## 2023-11-20 ENCOUNTER — TRANSFERRED RECORDS (OUTPATIENT)
Dept: HEALTH INFORMATION MANAGEMENT | Facility: CLINIC | Age: 73
End: 2023-11-20
Payer: COMMERCIAL

## 2023-12-06 ENCOUNTER — OFFICE VISIT (OUTPATIENT)
Dept: INTERNAL MEDICINE | Facility: CLINIC | Age: 73
End: 2023-12-06
Payer: COMMERCIAL

## 2023-12-06 VITALS
HEIGHT: 69 IN | HEART RATE: 73 BPM | OXYGEN SATURATION: 98 % | DIASTOLIC BLOOD PRESSURE: 60 MMHG | WEIGHT: 230.2 LBS | BODY MASS INDEX: 34.1 KG/M2 | RESPIRATION RATE: 12 BRPM | SYSTOLIC BLOOD PRESSURE: 110 MMHG | TEMPERATURE: 97.3 F

## 2023-12-06 DIAGNOSIS — L21.9 SEBORRHEIC DERMATITIS: ICD-10-CM

## 2023-12-06 DIAGNOSIS — E11.319 TYPE 2 DIABETES MELLITUS WITH RETINOPATHY, WITH LONG-TERM CURRENT USE OF INSULIN, MACULAR EDEMA PRESENCE UNSPECIFIED, UNSPECIFIED LATERALITY, UNSPECIFIED RETINOPATHY SEVERITY (H): ICD-10-CM

## 2023-12-06 DIAGNOSIS — Z79.4 TYPE 2 DIABETES MELLITUS WITH RETINOPATHY, WITH LONG-TERM CURRENT USE OF INSULIN, MACULAR EDEMA PRESENCE UNSPECIFIED, UNSPECIFIED LATERALITY, UNSPECIFIED RETINOPATHY SEVERITY (H): ICD-10-CM

## 2023-12-06 DIAGNOSIS — R51.9 FACIAL PAIN: Primary | ICD-10-CM

## 2023-12-06 LAB
ALBUMIN SERPL BCG-MCNC: 4.2 G/DL (ref 3.5–5.2)
ALP SERPL-CCNC: 100 U/L (ref 40–150)
ALT SERPL W P-5'-P-CCNC: 17 U/L (ref 0–70)
ANION GAP SERPL CALCULATED.3IONS-SCNC: 12 MMOL/L (ref 7–15)
AST SERPL W P-5'-P-CCNC: 20 U/L (ref 0–45)
BILIRUB SERPL-MCNC: 0.7 MG/DL
BUN SERPL-MCNC: 20.4 MG/DL (ref 8–23)
CALCIUM SERPL-MCNC: 9.2 MG/DL (ref 8.8–10.2)
CHLORIDE SERPL-SCNC: 101 MMOL/L (ref 98–107)
CREAT SERPL-MCNC: 1.01 MG/DL (ref 0.67–1.17)
DEPRECATED HCO3 PLAS-SCNC: 25 MMOL/L (ref 22–29)
EGFRCR SERPLBLD CKD-EPI 2021: 78 ML/MIN/1.73M2
GLUCOSE SERPL-MCNC: 168 MG/DL (ref 70–99)
HBA1C MFR BLD: 8.7 % (ref 0–5.6)
POTASSIUM SERPL-SCNC: 4.2 MMOL/L (ref 3.4–5.3)
PROT SERPL-MCNC: 7.1 G/DL (ref 6.4–8.3)
SODIUM SERPL-SCNC: 138 MMOL/L (ref 135–145)

## 2023-12-06 PROCEDURE — 99214 OFFICE O/P EST MOD 30 MIN: CPT | Performed by: INTERNAL MEDICINE

## 2023-12-06 PROCEDURE — 83036 HEMOGLOBIN GLYCOSYLATED A1C: CPT | Performed by: INTERNAL MEDICINE

## 2023-12-06 PROCEDURE — 36415 COLL VENOUS BLD VENIPUNCTURE: CPT | Performed by: INTERNAL MEDICINE

## 2023-12-06 PROCEDURE — 80053 COMPREHEN METABOLIC PANEL: CPT | Performed by: INTERNAL MEDICINE

## 2023-12-06 RX ORDER — DOXYCYCLINE HYCLATE 50 MG/1
50 CAPSULE ORAL 2 TIMES DAILY
COMMUNITY
Start: 2023-11-28

## 2023-12-06 RX ORDER — KETOCONAZOLE 20 MG/G
CREAM TOPICAL DAILY
Qty: 15 G | Refills: 0 | Status: SHIPPED | OUTPATIENT
Start: 2023-12-06

## 2023-12-06 RX ORDER — RESPIRATORY SYNCYTIAL VIRUS VACCINE 120MCG/0.5
0.5 KIT INTRAMUSCULAR ONCE
Qty: 1 EACH | Refills: 0 | Status: CANCELLED | OUTPATIENT
Start: 2023-12-06 | End: 2023-12-06

## 2023-12-06 NOTE — PATIENT INSTRUCTIONS
Please use the ketoconazole (new rx at pharmacy) and the fluocinonide (steroid--very sparingly) to your face rash.

## 2023-12-06 NOTE — PROGRESS NOTES
1. Facial pain  We discussed the differential.  It is already getting better.  He should watch for rash or evidence of facial weakness in which case he should let us know right away if he develops something like this.  Otherwise as long as it is getting better reassurance.    2. Type 2 diabetes mellitus with retinopathy, with long-term current use of insulin, macular edema presence unspecified, unspecified laterality, unspecified retinopathy severity (H)  Labs for monitoring.  He is doing well with the Mounjaro.  - HEMOGLOBIN A1C; Future  - Comprehensive metabolic panel (BMP + Alb, Alk Phos, ALT, AST, Total. Bili, TP); Future  - HEMOGLOBIN A1C  - Comprehensive metabolic panel (BMP + Alb, Alk Phos, ALT, AST, Total. Bili, TP)    3. Need for vaccination against respiratory syncytial virus  He has had his RSV vaccine already we updated that today.    4. Seborrheic dermatitis  Trial of ketoconazole cream as well as he can utilize the steroid cream that he has received from his dermatologist very sparingly over his face.  - ketoconazole (NIZORAL) 2 % external cream; Apply topically daily  Dispense: 15 g; Refill: 0     Subjective   Basim is a 73 year old, presenting for the following health issues:  Pain (Pt c/o facial pain on right side of the head, travelling down to below right ear. Pt states he has experienced this type of pain before but it usually resolves on its own.)      12/6/2023    12:09 PM   Additional Questions   Roomed by Lester SPIVEY RN       Pain    History of Present Illness       Reason for visit:  Tender side of face  Symptom onset:  1-3 days ago  Symptoms include:  Skin is tender to touch  Symptom intensity:  Mild  Symptom progression:  Staying the same  Had these symptoms before:  No    He eats 0-1 servings of fruits and vegetables daily.He consumes 2 sweetened beverage(s) daily.He exercises with enough effort to increase his heart rate 9 or less minutes per day.  He exercises with enough effort to  "increase his heart rate 3 or less days per week.   He is taking medications regularly.           Patient comes in today for evaluation of facial pain.  It is getting better he tells me.  He notes that vague discomfort along his scalp on the right-hand side.  He comes and sometimes gets this intermittently.  He thought that it would probably go away and it is starting to go away.  There is no rash.      Review of Systems         Objective    /60 (BP Location: Left arm, Patient Position: Sitting, Cuff Size: Adult Regular)   Pulse 73   Temp 97.3  F (36.3  C) (Tympanic)   Resp 12   Ht 1.76 m (5' 9.3\")   Wt 104.4 kg (230 lb 3.2 oz)   SpO2 98%   BMI 33.70 kg/m    Body mass index is 33.7 kg/m .  Physical Exam           There is no rash on his scalp.  He looks well.  No facial asymmetry or facial muscle weakness.  He has seborrheic dermatitis in his nasolabial fold              "

## 2024-01-29 RX ORDER — ROPINIROLE 1 MG/1
TABLET, FILM COATED ORAL
Qty: 720 TABLET | Refills: 1 | OUTPATIENT
Start: 2024-01-29

## 2024-03-12 ENCOUNTER — TRANSFERRED RECORDS (OUTPATIENT)
Dept: MULTI SPECIALTY CLINIC | Facility: CLINIC | Age: 74
End: 2024-03-12
Payer: COMMERCIAL

## 2024-03-12 LAB — HBA1C MFR BLD: 9 %

## 2024-03-17 ENCOUNTER — HEALTH MAINTENANCE LETTER (OUTPATIENT)
Age: 74
End: 2024-03-17

## 2024-03-26 ENCOUNTER — TRANSFERRED RECORDS (OUTPATIENT)
Dept: MULTI SPECIALTY CLINIC | Facility: CLINIC | Age: 74
End: 2024-03-26
Payer: COMMERCIAL

## 2024-03-26 LAB — RETINOPATHY: NORMAL

## 2024-03-28 ENCOUNTER — OFFICE VISIT (OUTPATIENT)
Dept: INTERNAL MEDICINE | Facility: CLINIC | Age: 74
End: 2024-03-28
Payer: COMMERCIAL

## 2024-03-28 ENCOUNTER — ANCILLARY PROCEDURE (OUTPATIENT)
Dept: GENERAL RADIOLOGY | Facility: CLINIC | Age: 74
End: 2024-03-28
Attending: INTERNAL MEDICINE
Payer: COMMERCIAL

## 2024-03-28 VITALS
RESPIRATION RATE: 16 BRPM | HEIGHT: 69 IN | TEMPERATURE: 97.9 F | HEART RATE: 65 BPM | SYSTOLIC BLOOD PRESSURE: 122 MMHG | BODY MASS INDEX: 33.99 KG/M2 | DIASTOLIC BLOOD PRESSURE: 62 MMHG | OXYGEN SATURATION: 99 %

## 2024-03-28 DIAGNOSIS — G63 POLYNEUROPATHY ASSOCIATED WITH UNDERLYING DISEASE (H): ICD-10-CM

## 2024-03-28 DIAGNOSIS — E11.319 TYPE 2 DIABETES MELLITUS WITH RETINOPATHY, WITH LONG-TERM CURRENT USE OF INSULIN, MACULAR EDEMA PRESENCE UNSPECIFIED, UNSPECIFIED LATERALITY, UNSPECIFIED RETINOPATHY SEVERITY (H): Primary | ICD-10-CM

## 2024-03-28 DIAGNOSIS — R07.89 LEFT-SIDED CHEST WALL PAIN: ICD-10-CM

## 2024-03-28 DIAGNOSIS — H20.9 IRITIS: ICD-10-CM

## 2024-03-28 DIAGNOSIS — Z79.4 TYPE 2 DIABETES MELLITUS WITH RETINOPATHY, WITH LONG-TERM CURRENT USE OF INSULIN, MACULAR EDEMA PRESENCE UNSPECIFIED, UNSPECIFIED LATERALITY, UNSPECIFIED RETINOPATHY SEVERITY (H): Primary | ICD-10-CM

## 2024-03-28 LAB
ERYTHROCYTE [SEDIMENTATION RATE] IN BLOOD BY WESTERGREN METHOD: 9 MM/HR (ref 0–20)
T PALLIDUM AB SER QL: NONREACTIVE

## 2024-03-28 PROCEDURE — 71100 X-RAY EXAM RIBS UNI 2 VIEWS: CPT | Mod: TC | Performed by: RADIOLOGY

## 2024-03-28 PROCEDURE — 86140 C-REACTIVE PROTEIN: CPT | Performed by: INTERNAL MEDICINE

## 2024-03-28 PROCEDURE — 85652 RBC SED RATE AUTOMATED: CPT | Performed by: INTERNAL MEDICINE

## 2024-03-28 PROCEDURE — 36415 COLL VENOUS BLD VENIPUNCTURE: CPT | Performed by: INTERNAL MEDICINE

## 2024-03-28 PROCEDURE — 86038 ANTINUCLEAR ANTIBODIES: CPT | Performed by: INTERNAL MEDICINE

## 2024-03-28 PROCEDURE — 86780 TREPONEMA PALLIDUM: CPT | Performed by: INTERNAL MEDICINE

## 2024-03-28 PROCEDURE — 99214 OFFICE O/P EST MOD 30 MIN: CPT | Performed by: INTERNAL MEDICINE

## 2024-03-28 RX ORDER — PREDNISOLONE ACETATE 10 MG/ML
SUSPENSION/ DROPS OPHTHALMIC
COMMUNITY
Start: 2024-03-22 | End: 2024-04-01

## 2024-03-28 RX ORDER — CLINDAMYCIN PHOSPHATE 11.9 MG/ML
SOLUTION TOPICAL PRN
COMMUNITY
End: 2024-05-20

## 2024-03-28 ASSESSMENT — ENCOUNTER SYMPTOMS: HIP PAIN: 1

## 2024-03-28 NOTE — PROGRESS NOTES
1. Type 2 diabetes mellitus with retinopathy, with long-term current use of insulin, macular edema presence unspecified, unspecified laterality, unspecified retinopathy severity (H)  Continue close follow-up with his endocrinologist.    2. Polyneuropathy associated with underlying disease (H24)  As above.    3. Left-sided chest wall pain  I do not feel any masses here.  Check x-ray however.  - XR Chest 2 Views; Future  - XR Ribs Left 2 Views; Future    4. Iritis  He does not have any obvious underlying autoimmune issues.  Will check labs as below.  I have suggested he see an ophthalmologist as he does not have 1 of these.  - ESR: Erythrocyte sedimentation rate; Future  - CRP, inflammation; Future  - Anti Nuclear Tiffany IgG by IFA with Reflex; Future  - Treponema Abs w Reflex to RPR and Titer; Future  - ESR: Erythrocyte sedimentation rate  - CRP, inflammation  - Anti Nuclear Tiffany IgG by IFA with Reflex  - Treponema Abs w Reflex to RPR and Titer     Subjective   Basim is a 73 year old, presenting for the following health issues:  Follow Up (Recently seen Eye MD on 3/26/24 whom recommends autoimmune lab testing//) and Hip Pain (Intermittent left side hip discomfort/ache (Sx's are better & is not an issue at this time) )      3/28/2024     1:05 PM   Additional Questions   Roomed by BRANDEE Little   Accompanied by alone     Via the Health Maintenance questionnaire, the patient has reported the following services have been completed -Eye Exam, this information has been sent to the abstraction team.  Hip Pain    History of Present Illness       Reason for visit:  Lab work request  Symptom onset:  More than a month  Symptoms include:  Eye inflamation  Symptom intensity:  Mild  Symptom progression:  Improving  Had these symptoms before:  Yes  Has tried/received treatment for these symptoms:  Yes  Previous treatment was successful:  Yes  Prior treatment description:  Eye drops    He eats 0-1 servings of fruits and vegetables daily.He  "consumes 1 sweetened beverage(s) daily.He exercises with enough effort to increase his heart rate 9 or less minutes per day.  He exercises with enough effort to increase his heart rate 3 or less days per week.   He is taking medications regularly.       Basim comes in today for several things.  First of all he noticed something on his left side.  He is laying in bed and thought maybe he was laying on something may be a mass but then he thought maybe it was the sheets he was not sure what he was feeling.  Is in the mid axillary region on the left chest wall.  He also has had a couple episodes of iritis and his optometrist told him he should be checked for \"autoimmune\".  He does not have any history of sarcoid or other autoimmune diseases that are typically associated with uveitis or iritis.  He does have diabetes of which has been poorly controlled however.            Objective    /62 (BP Location: Left arm, Patient Position: Sitting, Cuff Size: Adult Regular)   Pulse 65   Temp 97.9  F (36.6  C) (Tympanic)   Resp 16   Ht 1.753 m (5' 9\")   SpO2 99%   BMI 33.99 kg/m    Body mass index is 33.99 kg/m .  Physical Exam   I do not feel any mass in the chest wall area.  No evidence of eye irritation            Signed Electronically by: SILVIANO JAVIER MD    "

## 2024-03-29 LAB
ANA SER QL IF: NEGATIVE
CRP SERPL-MCNC: <3 MG/L

## 2024-04-16 ENCOUNTER — PATIENT OUTREACH (OUTPATIENT)
Dept: CARE COORDINATION | Facility: CLINIC | Age: 74
End: 2024-04-16
Payer: COMMERCIAL

## 2024-04-23 DIAGNOSIS — Z79.4 TYPE 2 DIABETES MELLITUS WITHOUT COMPLICATION, WITH LONG-TERM CURRENT USE OF INSULIN (H): ICD-10-CM

## 2024-04-23 DIAGNOSIS — E11.9 TYPE 2 DIABETES MELLITUS WITHOUT COMPLICATION, WITH LONG-TERM CURRENT USE OF INSULIN (H): ICD-10-CM

## 2024-04-23 RX ORDER — LISINOPRIL AND HYDROCHLOROTHIAZIDE 12.5; 2 MG/1; MG/1
TABLET ORAL
Qty: 90 TABLET | Refills: 2 | Status: SHIPPED | OUTPATIENT
Start: 2024-04-23

## 2024-04-30 ENCOUNTER — PATIENT OUTREACH (OUTPATIENT)
Dept: CARE COORDINATION | Facility: CLINIC | Age: 74
End: 2024-04-30
Payer: COMMERCIAL

## 2024-05-17 DIAGNOSIS — E11.9 TYPE 2 DIABETES MELLITUS WITHOUT COMPLICATIONS (H): ICD-10-CM

## 2024-05-20 ENCOUNTER — OFFICE VISIT (OUTPATIENT)
Dept: INTERNAL MEDICINE | Facility: CLINIC | Age: 74
End: 2024-05-20
Payer: COMMERCIAL

## 2024-05-20 VITALS
DIASTOLIC BLOOD PRESSURE: 60 MMHG | RESPIRATION RATE: 14 BRPM | SYSTOLIC BLOOD PRESSURE: 114 MMHG | TEMPERATURE: 97.8 F | BODY MASS INDEX: 33.97 KG/M2 | WEIGHT: 230 LBS | OXYGEN SATURATION: 95 % | HEART RATE: 68 BPM

## 2024-05-20 DIAGNOSIS — E11.319 TYPE 2 DIABETES MELLITUS WITH RETINOPATHY, WITH LONG-TERM CURRENT USE OF INSULIN, MACULAR EDEMA PRESENCE UNSPECIFIED, UNSPECIFIED LATERALITY, UNSPECIFIED RETINOPATHY SEVERITY (H): Primary | ICD-10-CM

## 2024-05-20 DIAGNOSIS — E11.3299 NONPROLIFERATIVE DIABETIC RETINOPATHY (H): ICD-10-CM

## 2024-05-20 DIAGNOSIS — Z79.4 TYPE 2 DIABETES MELLITUS WITH RETINOPATHY, WITH LONG-TERM CURRENT USE OF INSULIN, MACULAR EDEMA PRESENCE UNSPECIFIED, UNSPECIFIED LATERALITY, UNSPECIFIED RETINOPATHY SEVERITY (H): Primary | ICD-10-CM

## 2024-05-20 DIAGNOSIS — G25.81 RESTLESS LEGS SYNDROME: ICD-10-CM

## 2024-05-20 DIAGNOSIS — I10 ESSENTIAL HYPERTENSION: ICD-10-CM

## 2024-05-20 PROCEDURE — 99214 OFFICE O/P EST MOD 30 MIN: CPT | Performed by: INTERNAL MEDICINE

## 2024-05-20 PROCEDURE — 90480 ADMN SARSCOV2 VAC 1/ONLY CMP: CPT | Performed by: INTERNAL MEDICINE

## 2024-05-20 PROCEDURE — 91320 SARSCV2 VAC 30MCG TRS-SUC IM: CPT | Performed by: INTERNAL MEDICINE

## 2024-05-20 RX ORDER — TIRZEPATIDE 15 MG/.5ML
15 INJECTION, SOLUTION SUBCUTANEOUS WEEKLY
COMMUNITY
Start: 2024-04-17

## 2024-05-20 RX ORDER — GABAPENTIN 300 MG/1
CAPSULE ORAL
Qty: 360 CAPSULE | Refills: 3 | Status: SHIPPED | OUTPATIENT
Start: 2024-05-20

## 2024-05-20 NOTE — PROGRESS NOTES
1. Type 2 diabetes mellitus with retinopathy, with long-term current use of insulin, macular edema presence unspecified, unspecified laterality, unspecified retinopathy severity (H)  Presumed good control based on numbers he brings in however his last A1c was 9.  He will return for labs in about a month and we will recheck to see how he is doing.  - Comprehensive metabolic panel (BMP + Alb, Alk Phos, ALT, AST, Total. Bili, TP); Future  - Hemoglobin A1c; Future    2. Restless legs syndrome  I have recommended evaluation for sleep provider.  - Adult Sleep Eval & Management  Referral; Future    3. Nonproliferative diabetic retinopathy (H)  Medications per endocrine.  Follow-up with his retina specialist  - Lipid panel reflex to direct LDL Non-fasting; Future    4. Essential hypertension  Blood pressure very well-controlled    Subjective   Basim is a 73 year old, presenting for the following health issues:  Diabetes (General check in - not fasting (Covid Booster) ) and RLS  (Pt reports intermittent rachel of restless leg )      5/20/2024    11:34 AM   Additional Questions   Roomed by BRANDEE Little   Accompanied by alone         5/20/2024    11:34 AM   Patient Reported Additional Medications   Patient reports taking the following new medications none     History of Present Illness       Diabetes:   He presents for follow up of diabetes.  He is checking home blood glucose a few times a month.   He checks blood glucose after meals.  Blood glucose is sometimes over 200 and never under 70. He is aware of hypoglycemia symptoms including shakiness.    He has no concerns regarding his diabetes at this time.  He is having numbness in feet and burning in feet.        He consumes 0 sweetened beverage(s) daily.He exercises with enough effort to increase his heart rate 9 or less minutes per day.  He exercises with enough effort to increase his heart rate 3 or less days per week. He is missing 1 dose(s) of medications per  week.  He is not taking prescribed medications regularly due to remembering to take.           Comes in today for follow-up of his multimedical problems.  He has morbid obesity and diabetes which has been poorly controlled over the years.  He follows with endocrinology regarding this.  He also used to see his endocrinologist for his restless legs apparently.  I think his endocrine provider was acting as a general internist.  He has never seen a sleep provider.  His restless legs have been bothering him more lately.  He is on fairly high-dose ropinirole.  He is very pleased with the Mounjaro that he is on.  It is done a really good job of curbing his appetite and his binge eating disorder.           Objective    /60 (BP Location: Left arm, Patient Position: Sitting, Cuff Size: Adult Small)   Pulse 68   Temp 97.8  F (36.6  C) (Tympanic)   Resp 14   Wt 104.3 kg (230 lb)   SpO2 95%   BMI 33.97 kg/m    Body mass index is 33.97 kg/m .  Physical Exam   Pleasant gentleman who looks well.  Good spirits.  Occasionally tearful when talking about Mounjaro and how pleased he is with that            Signed Electronically by: SILVIANO JAVIER MD

## 2024-06-06 ENCOUNTER — OFFICE VISIT (OUTPATIENT)
Dept: INTERNAL MEDICINE | Facility: CLINIC | Age: 74
End: 2024-06-06
Payer: COMMERCIAL

## 2024-06-06 VITALS
TEMPERATURE: 96.8 F | WEIGHT: 231 LBS | BODY MASS INDEX: 34.21 KG/M2 | SYSTOLIC BLOOD PRESSURE: 109 MMHG | DIASTOLIC BLOOD PRESSURE: 72 MMHG | RESPIRATION RATE: 16 BRPM | HEIGHT: 69 IN | HEART RATE: 59 BPM | OXYGEN SATURATION: 97 %

## 2024-06-06 DIAGNOSIS — R60.0 LOWER EXTREMITY EDEMA: Primary | ICD-10-CM

## 2024-06-06 DIAGNOSIS — Z79.4 TYPE 2 DIABETES MELLITUS WITH RETINOPATHY, WITH LONG-TERM CURRENT USE OF INSULIN, MACULAR EDEMA PRESENCE UNSPECIFIED, UNSPECIFIED LATERALITY, UNSPECIFIED RETINOPATHY SEVERITY (H): ICD-10-CM

## 2024-06-06 DIAGNOSIS — E11.319 TYPE 2 DIABETES MELLITUS WITH RETINOPATHY, WITH LONG-TERM CURRENT USE OF INSULIN, MACULAR EDEMA PRESENCE UNSPECIFIED, UNSPECIFIED LATERALITY, UNSPECIFIED RETINOPATHY SEVERITY (H): ICD-10-CM

## 2024-06-06 DIAGNOSIS — G25.81 RESTLESS LEGS SYNDROME: ICD-10-CM

## 2024-06-06 LAB
ALBUMIN UR-MCNC: NEGATIVE MG/DL
APPEARANCE UR: CLEAR
ATRIAL RATE - MUSE: 62 BPM
BILIRUB UR QL STRIP: NEGATIVE
COLOR UR AUTO: YELLOW
DIASTOLIC BLOOD PRESSURE - MUSE: NORMAL MMHG
GLUCOSE UR STRIP-MCNC: NEGATIVE MG/DL
HGB UR QL STRIP: NEGATIVE
INTERPRETATION ECG - MUSE: NORMAL
KETONES UR STRIP-MCNC: NEGATIVE MG/DL
LEUKOCYTE ESTERASE UR QL STRIP: NEGATIVE
NITRATE UR QL: NEGATIVE
P AXIS - MUSE: -10 DEGREES
PH UR STRIP: 5.5 [PH] (ref 5–8)
PR INTERVAL - MUSE: 206 MS
QRS DURATION - MUSE: 148 MS
QT - MUSE: 428 MS
QTC - MUSE: 434 MS
R AXIS - MUSE: -36 DEGREES
SP GR UR STRIP: >=1.03 (ref 1–1.03)
SYSTOLIC BLOOD PRESSURE - MUSE: NORMAL MMHG
T AXIS - MUSE: -11 DEGREES
UROBILINOGEN UR STRIP-ACNC: 1 E.U./DL
VENTRICULAR RATE- MUSE: 62 BPM

## 2024-06-06 PROCEDURE — 85379 FIBRIN DEGRADATION QUANT: CPT | Performed by: INTERNAL MEDICINE

## 2024-06-06 PROCEDURE — 81003 URINALYSIS AUTO W/O SCOPE: CPT | Performed by: INTERNAL MEDICINE

## 2024-06-06 PROCEDURE — G2211 COMPLEX E/M VISIT ADD ON: HCPCS | Performed by: INTERNAL MEDICINE

## 2024-06-06 PROCEDURE — 99214 OFFICE O/P EST MOD 30 MIN: CPT | Performed by: INTERNAL MEDICINE

## 2024-06-06 PROCEDURE — 93005 ELECTROCARDIOGRAM TRACING: CPT | Performed by: INTERNAL MEDICINE

## 2024-06-06 PROCEDURE — 80053 COMPREHEN METABOLIC PANEL: CPT | Performed by: INTERNAL MEDICINE

## 2024-06-06 PROCEDURE — 36415 COLL VENOUS BLD VENIPUNCTURE: CPT | Performed by: INTERNAL MEDICINE

## 2024-06-06 ASSESSMENT — PAIN SCALES - GENERAL: PAINLEVEL: NO PAIN (0)

## 2024-06-06 NOTE — PROGRESS NOTES
1. Lower extremity edema  Nothing really on exam today.  EKG is unchanged.  Will check labs including urinalysis.  Check D-dimer although suspicion for a clot is extremely low.  We discussed salt restriction.  We discussed that he probably has some venous insufficiency.  He also has some venous stasis changes to his skin on the right.  I have recommended some compression stockings as needed.  - EKG 12-lead, tracing only  - Comprehensive metabolic panel (BMP + Alb, Alk Phos, ALT, AST, Total. Bili, TP); Future  - UA Macroscopic with reflex to Microscopic and Culture - Lab Collect; Future  - D dimer, quantitative; Future    2. Type 2 diabetes mellitus with retinopathy, with long-term current use of insulin, macular edema presence unspecified, unspecified laterality, unspecified retinopathy severity (H)  Will check labs given his underlying diabetes.  Make sure he is not in renal failure.    3. Restless legs syndrome  He is on a fair dose of gabapentin.  This can contribute to lower extremity edema but he has been on this for years.  Will be meeting with a sleep provider soon.  I question if he could have sleep apnea and some right heart failure as well.    Subjective   Basim is a 73 year old, presenting for the following health issues:  Edema (Edema for 3 days. Patient has increased his dosage of Tresiba to 34 units)    History of Present Illness       Reason for visit:  Swollen ankle  Symptom onset:  3-7 days ago    He eats 0-1 servings of fruits and vegetables daily.He consumes 0 sweetened beverage(s) daily.He exercises with enough effort to increase his heart rate 9 or less minutes per day.  He exercises with enough effort to increase his heart rate 3 or less days per week.   He is taking medications regularly.       Comes in as an urgent add-on for evaluation of lower extremity edema that he has noticed this week.  He tells me he is never had swollen ankles before.  He sent in some pictures with some fair swelling  "right greater than left.  Seems to get better.  He denies chest pains.  Denies shortness of breath.  No PND orthopnea.  He tells me he has not been eating a large amount of salt or on any new medications.  He did adjust his insulin recently but only by couple units.  He has not been more sedentary than usual.  No recent travel.              Objective    /72 (BP Location: Left arm, Patient Position: Sitting, Cuff Size: Adult Regular)   Pulse 59   Temp 96.8  F (36  C) (Tympanic)   Resp 16   Ht 1.753 m (5' 9\")   Wt 104.8 kg (231 lb)   SpO2 94%   BMI 34.11 kg/m    Body mass index is 34.11 kg/m .  Physical Exam   Pleasant gentleman who looks well.  No edema today.  Heart is regular.  O2 sat initially 94%.  He does have a little bit of varicosities on the right and a little bit of brownish brawny discoloration on the right shin            Signed Electronically by: SILVIANO JAVIER MD    "

## 2024-06-07 LAB
ALBUMIN SERPL BCG-MCNC: 4.2 G/DL (ref 3.5–5.2)
ALP SERPL-CCNC: 107 U/L (ref 40–150)
ALT SERPL W P-5'-P-CCNC: 14 U/L (ref 0–70)
ANION GAP SERPL CALCULATED.3IONS-SCNC: 14 MMOL/L (ref 7–15)
AST SERPL W P-5'-P-CCNC: 22 U/L (ref 0–45)
BILIRUB SERPL-MCNC: 0.7 MG/DL
BUN SERPL-MCNC: 20.3 MG/DL (ref 8–23)
CALCIUM SERPL-MCNC: 9.3 MG/DL (ref 8.8–10.2)
CHLORIDE SERPL-SCNC: 100 MMOL/L (ref 98–107)
CREAT SERPL-MCNC: 0.99 MG/DL (ref 0.67–1.17)
D DIMER PPP FEU-MCNC: 1.09 UG/ML FEU (ref 0–0.5)
DEPRECATED HCO3 PLAS-SCNC: 23 MMOL/L (ref 22–29)
EGFRCR SERPLBLD CKD-EPI 2021: 80 ML/MIN/1.73M2
GLUCOSE SERPL-MCNC: 83 MG/DL (ref 70–99)
POTASSIUM SERPL-SCNC: 4.3 MMOL/L (ref 3.4–5.3)
PROT SERPL-MCNC: 7.3 G/DL (ref 6.4–8.3)
SODIUM SERPL-SCNC: 137 MMOL/L (ref 135–145)

## 2024-06-11 ENCOUNTER — HOSPITAL ENCOUNTER (OUTPATIENT)
Dept: ULTRASOUND IMAGING | Facility: CLINIC | Age: 74
Discharge: HOME | End: 2024-06-11
Attending: INTERNAL MEDICINE | Admitting: INTERNAL MEDICINE
Payer: COMMERCIAL

## 2024-06-11 DIAGNOSIS — R60.0 LOWER EXTREMITY EDEMA: ICD-10-CM

## 2024-06-11 PROCEDURE — 93970 EXTREMITY STUDY: CPT

## 2024-06-17 ENCOUNTER — LAB (OUTPATIENT)
Dept: LAB | Facility: CLINIC | Age: 74
End: 2024-06-17
Payer: COMMERCIAL

## 2024-06-17 DIAGNOSIS — E11.3299 NONPROLIFERATIVE DIABETIC RETINOPATHY (H): ICD-10-CM

## 2024-06-17 DIAGNOSIS — Z79.4 TYPE 2 DIABETES MELLITUS WITH RETINOPATHY, WITH LONG-TERM CURRENT USE OF INSULIN, MACULAR EDEMA PRESENCE UNSPECIFIED, UNSPECIFIED LATERALITY, UNSPECIFIED RETINOPATHY SEVERITY (H): ICD-10-CM

## 2024-06-17 DIAGNOSIS — E11.319 TYPE 2 DIABETES MELLITUS WITH RETINOPATHY, WITH LONG-TERM CURRENT USE OF INSULIN, MACULAR EDEMA PRESENCE UNSPECIFIED, UNSPECIFIED LATERALITY, UNSPECIFIED RETINOPATHY SEVERITY (H): ICD-10-CM

## 2024-06-17 LAB
ALBUMIN SERPL BCG-MCNC: 4.2 G/DL (ref 3.5–5.2)
ALP SERPL-CCNC: 105 U/L (ref 40–150)
ALT SERPL W P-5'-P-CCNC: 13 U/L (ref 0–70)
ANION GAP SERPL CALCULATED.3IONS-SCNC: 9 MMOL/L (ref 7–15)
AST SERPL W P-5'-P-CCNC: 22 U/L (ref 0–45)
BILIRUB SERPL-MCNC: 0.6 MG/DL
BUN SERPL-MCNC: 23.4 MG/DL (ref 8–23)
CALCIUM SERPL-MCNC: 9.4 MG/DL (ref 8.8–10.2)
CHLORIDE SERPL-SCNC: 102 MMOL/L (ref 98–107)
CHOLEST SERPL-MCNC: 100 MG/DL
CREAT SERPL-MCNC: 1.02 MG/DL (ref 0.67–1.17)
DEPRECATED HCO3 PLAS-SCNC: 26 MMOL/L (ref 22–29)
EGFRCR SERPLBLD CKD-EPI 2021: 77 ML/MIN/1.73M2
FASTING STATUS PATIENT QL REPORTED: NO
FASTING STATUS PATIENT QL REPORTED: NO
GLUCOSE SERPL-MCNC: 97 MG/DL (ref 70–99)
HBA1C MFR BLD: 8.1 % (ref 0–5.6)
HDLC SERPL-MCNC: 43 MG/DL
LDLC SERPL CALC-MCNC: 44 MG/DL
NONHDLC SERPL-MCNC: 57 MG/DL
POTASSIUM SERPL-SCNC: 4.2 MMOL/L (ref 3.4–5.3)
PROT SERPL-MCNC: 7.4 G/DL (ref 6.4–8.3)
SODIUM SERPL-SCNC: 137 MMOL/L (ref 135–145)
TRIGL SERPL-MCNC: 63 MG/DL

## 2024-06-17 PROCEDURE — 36415 COLL VENOUS BLD VENIPUNCTURE: CPT

## 2024-06-17 PROCEDURE — 83036 HEMOGLOBIN GLYCOSYLATED A1C: CPT

## 2024-06-17 PROCEDURE — 80053 COMPREHEN METABOLIC PANEL: CPT

## 2024-06-17 PROCEDURE — 80061 LIPID PANEL: CPT

## 2024-07-07 ASSESSMENT — SLEEP AND FATIGUE QUESTIONNAIRES
HOW LIKELY ARE YOU TO NOD OFF OR FALL ASLEEP IN A CAR, WHILE STOPPED FOR A FEW MINUTES IN TRAFFIC: WOULD NEVER DOZE
HOW LIKELY ARE YOU TO NOD OFF OR FALL ASLEEP WHILE SITTING QUIETLY AFTER LUNCH WITHOUT ALCOHOL: WOULD NEVER DOZE
HOW LIKELY ARE YOU TO NOD OFF OR FALL ASLEEP WHEN YOU ARE A PASSENGER IN A CAR FOR AN HOUR WITHOUT A BREAK: WOULD NEVER DOZE
HOW LIKELY ARE YOU TO NOD OFF OR FALL ASLEEP WHILE LYING DOWN TO REST IN THE AFTERNOON WHEN CIRCUMSTANCES PERMIT: SLIGHT CHANCE OF DOZING
HOW LIKELY ARE YOU TO NOD OFF OR FALL ASLEEP WHILE WATCHING TV: HIGH CHANCE OF DOZING
HOW LIKELY ARE YOU TO NOD OFF OR FALL ASLEEP WHILE SITTING AND TALKING TO SOMEONE: WOULD NEVER DOZE
HOW LIKELY ARE YOU TO NOD OFF OR FALL ASLEEP WHILE SITTING INACTIVE IN A PUBLIC PLACE: WOULD NEVER DOZE
HOW LIKELY ARE YOU TO NOD OFF OR FALL ASLEEP WHILE SITTING AND READING: MODERATE CHANCE OF DOZING

## 2024-07-09 ENCOUNTER — OFFICE VISIT (OUTPATIENT)
Dept: SLEEP MEDICINE | Facility: CLINIC | Age: 74
End: 2024-07-09
Attending: INTERNAL MEDICINE
Payer: COMMERCIAL

## 2024-07-09 VITALS
BODY MASS INDEX: 34.07 KG/M2 | SYSTOLIC BLOOD PRESSURE: 113 MMHG | WEIGHT: 230 LBS | HEART RATE: 74 BPM | OXYGEN SATURATION: 99 % | HEIGHT: 69 IN | DIASTOLIC BLOOD PRESSURE: 72 MMHG

## 2024-07-09 DIAGNOSIS — G25.81 RESTLESS LEGS SYNDROME: Primary | ICD-10-CM

## 2024-07-09 PROCEDURE — 99203 OFFICE O/P NEW LOW 30 MIN: CPT | Performed by: INTERNAL MEDICINE

## 2024-07-09 NOTE — PATIENT INSTRUCTIONS
"What is restless legs syndrome(Ferrera Ekbom Disease)?    Restless legs syndrome, or RLS for short, or restless body syndrome is a condition that causes strange sensations in your legs. RLS is also sometimes called \"Ferrera-Ekbom disease.\" If you have RLS, you probably have the urge to kick or move your legs at night. This can wake you up or make it hard to sleep.  In some cases, RLS happens on its own and seems to be passed on in families. In other cases, the condition seems to be linked to other medical problems. For instance, a condition called \"anemia,\" in which there is too little iron in the blood, seems to increase the risk of RLS. Other conditions that increase the risk of RLS include kidney disease, diabetes, and multiple sclerosis. Pregnancy seems to increase a woman's risk of developing RLS, too.  What are the symptoms of RLS? -- People who have RLS get an uncomfortable urge to move their legs when they are at rest. They describe the feeling as crawling, creeping, pulling, or itching. And they say the feeling is deep in the legs - not on the skin - usually below the knees. These symptoms usually get worse as the day moves on, and they are worst at night. But people can make the feeling go away temporarily if they kick or move their legs. Some people with RLS find that their legs move on their own while they are asleep.  In short, the symptoms:  ?Happen when you are at rest  ?Go away if you move your legs on purpose  ?Are worst at night  ?Sometimes include the legs moving on their own during sleep  Together, the symptoms of RLS can make it hard to get a good night's sleep. People with the condition often feel tired during the day.  Is there a test for RLS? -- There is a test, but it is not usually necessary. Your doctor or nurse should be able to tell if you have it by asking about your symptoms and doing an exam. Still, it is possible that your doctor or nurse will decide to send you for a \"sleep study,\" to " be sure of what is happening.  For a sleep study, you spend the night in a lab, and you are hooked up to different machines that monitor your movements, heart rate, breathing, and other body functions.  Is there anything I can do on my own to feel better? -- Yes. You might feel better if you:  ?Do activities that keep your mind alert during the day, such as crossword puzzles  ?Get moderate regular exercise  ?Message your legs (or have someone massage them)   ?Apply heat to your legs with heating pads or by taking a warm bath  ?Avoid taking medicines that can make RLS worse - These include antihistamines like diphenhydramine (sample brand name: Benadryl).  Should I see a doctor or nurse? -- See your doctor or nurse if your condition bothers you, or if it keeps you from getting a good night's sleep.  How is RLS treated? -- Some people with RLS do not need medicine for it because they have mild symptoms that don't bother them very often. If treatment is needed, there are a number of medicines doctors can suggest. Examples include:  ?Iron supplements  ?Pramipexole (brand name: Mirapex)  ?Ropinirole (brand name: Requip)  ?Rotigotine (brand name: Neupro)   ?Carbidopa-levodopa (brand name: Sinemet)  ?Gabapentin enacarbil (brand name: Horizant)  ?Gabapentin (brand name: Neurontin)  ?Pregabalin (brand name: Lyrica)  In people with RLS who also have a severe form of kidney disease called kidney failure, the RLS might improve with a treatment called hemodialysis (also known as just dialysis).  What if I am pregnant? -- If you are pregnant, you can take iron supplements and try the other tips that do not involve taking prescription medicines. Most of the medicines used to treat RLS are not safe to take during pregnancy. If your symptoms are bad, there are some medicines that might be OK to take. But keep in mind that the condition usually goes away toward the end of pregnancy.

## 2024-07-09 NOTE — PROGRESS NOTES
Additional 15 minutes on the date of service was spent performing the following:    -Preparing to see the patient  -Obtaining and/or reviewing separately obtained history   -Ordering medications, tests, or procedures   -Documenting clinical information in the electronic or other health record     Assessment and Plan:    1. Restless legs syndrome  I informed the patient that his regiment at this current time appears to optimally treat his symptoms with no side effects.   I would strongly recommend that he follow-up with his neurologist on a yearly basis to verify if gabapentin and Requip are optimal. As of now, I feel no further intervention is necessary.  If the patient's symptoms change in the future, I welcomed him to follow-up with me at that time.  - Adult Sleep Eval & Management  Referral    History of present illness:    He is a 73 year old male who comes to the clinic for second opinion about his current medication regiment for his restless leg syndrome.  He was started on Requip and gabapentin by his neurologist due to the fact that he was suffering from restless leg syndrome and peripheral neuropathy.  He has been on this current regiment for more than a year.  At the behest of his primary care provider he decided to get a second opinion.  He reports that his sleep quality is good and that he has no daytime sleepiness or fatigue.  He also has minimal limb discomfort during the daytime which is alleviated by walking and moving around.  He also denies any episodes of witnessed apnea or snoring.  He denies any episodes of sleep-related headaches or teeth grinding.     Sleep-Wake Cycle:    TIME IN BED:    1) Work/School Days:    Do you work or go to school? Yes   What time do you usually get into bed? 11pm some times fall asleep at TV   About how long does it take you to fall asleep? 15 min   How often do you have trouble falling asleep? 0   How often do you wake up during the night? 1   Do you work  days/evenings/nights/rotating shifts? Days   What wakes you up at night? Use the bathroom   How often do you have trouble falling back to sleep? 0   About how long does it take to fall back to sleep? 5 min   What do you usually do if you have trouble getting back to sleep? sit up and look at computer   What time do you usually get out of bed to start your day? 8am if working.  Sit up in bed on computer until 10 am if not working.   Do you use an alarm? No   2) Weekends/Non-work Days/All Other Days    What time do you usually get into bed? 11pm   About how long does it take you to fall asleep? 15 min some times I don't remember falling asleep.   What time do you usually get out of bed to start your day? 10am   Do you use an alarm? No   SLEEP NEED    On average, about how much sleep do you think you get? 6-7 hours   About how much sleep do you think you need? 6-7 hours   SLEEP POSITION    Which sleep positions do you prefer? Side   Do you do any of the following activities in bed? Eat    Watch TV    Use phone, computer, or tablet   How often do you take a nap on purpose? 0   About how long are your naps? 20 min   Do you feel better after naps? Yes   How often do you doze off unintentionally? 1   Have you ever had a driving accident or near-miss due to sleepiness/drowsiness? No     LASHON:  LASHON Total Score: 2  Total score - [place]: 6 (7/7/2024  9:06 AM)    Patient Active Problem List   Diagnosis    B12 deficiency    Dyslipidemia    Essential hypertension    History of bladder cancer    Hyperlipidemia    RBBB (right bundle branch block)    Restless legs syndrome    Type 2 diabetes mellitus with retinopathy, with long-term current use of insulin (H)    History of gastric bypass    Male erectile disorder    Peripheral neuropathy    Polyneuropathy associated with underlying disease (H24)    Nonproliferative diabetic retinopathy (H)       Past Medical History  Past Medical History:   Diagnosis Date    Cancer (H) 2010     bladder    Diabetes mellitus (H)     Disease of thyroid gland     Hordeolum externum     Hypertension     Intestinal malabsorption     Obesity     Restless leg     Vitamin B 12 deficiency         Past Surgical History  Past Surgical History:   Procedure Laterality Date    CATARACT EXTRACTION, BILATERAL  11/13    CYSTOSCOPY, TRANSURETHRAL RESECTION (TUR) TUMOR BLADDER, COMBINED  11/2010    GASTRIC BYPASS  1983    stapling    KERATOPLASTY  2007,2010    VASCULAR SURGERY      varicose vein stripping        Meds  Current Outpatient Medications   Medication Sig Dispense Refill    aspirin 81 MG EC tablet Take 81 mg by mouth daily      atorvastatin (LIPITOR) 20 MG tablet Take 1 tablet (20 mg) by mouth daily 90 tablet 3    cyanocobalamin (CYANOCOBALAMIN) 1000 MCG/ML injection Inject 1 mL (1,000 mcg) into the muscle every 30 days Take on the 26th of every month. 30 mL 3    gabapentin (NEURONTIN) 300 MG capsule TAKE 4 CAPSULES BY MOUTH NIGHTLY. 360 capsule 3    lisinopril-hydrochlorothiazide (ZESTORETIC) 20-12.5 MG tablet TAKE 1 TABLET BY MOUTH EVERY DAY 90 tablet 2    metFORMIN (GLUCOPHAGE) 500 MG tablet Take 1,000 mg by mouth 2 times daily (with meals)      MOUNJARO 15 MG/0.5ML pen Inject 15 mg Subcutaneous once a week      rOPINIRole (REQUIP) 1 MG tablet Take 7-8 mg by mouth At Bedtime      TRESIBA FLEXTOUCH 200 UNIT/ML pen INJECT 40 UNITS SUBCUTANEOUS ONCE DAILY (Patient taking differently: Inject 32 Units subcutaneously daily) 18 mL 1    vitamin D3 (CHOLECALCIFEROL) 50 mcg (2000 units) tablet Take 50 mcg by mouth every morning      doxycycline hyclate (VIBRAMYCIN) 50 MG capsule Take 50 mg by mouth 2 times daily As needed by mouth for scalp flare ups (Patient not taking: Reported on 5/20/2024)      ketoconazole (NIZORAL) 2 % external cream Apply topically daily (Patient not taking: Reported on 5/20/2024) 15 g 0        Allergies  Iodide     Social History  Social History     Socioeconomic History    Marital status: Single      Spouse name: Not on file    Number of children: Not on file    Years of education: Not on file    Highest education level: Not on file   Occupational History    Not on file   Tobacco Use    Smoking status: Former     Current packs/day: 0.00     Types: Cigarettes     Quit date: 8/3/1980     Years since quittin.9    Smokeless tobacco: Never   Vaping Use    Vaping status: Never Used   Substance and Sexual Activity    Alcohol use: No    Drug use: No    Sexual activity: Not on file   Other Topics Concern    Not on file   Social History Narrative    Not on file     Social Determinants of Health     Financial Resource Strain: Unknown (2023)    Financial Resource Strain     Within the past 12 months, have you or your family members you live with been unable to get utilities (heat, electricity) when it was really needed?: Patient refused   Food Insecurity: Low Risk  (2023)    Food Insecurity     Within the past 12 months, did you worry that your food would run out before you got money to buy more?: No     Within the past 12 months, did the food you bought just not last and you didn t have money to get more?: No   Transportation Needs: Low Risk  (2023)    Transportation Needs     Within the past 12 months, has lack of transportation kept you from medical appointments, getting your medicines, non-medical meetings or appointments, work, or from getting things that you need?: No   Physical Activity: Not on file   Stress: Not on file   Social Connections: Unknown (2021)    Received from [facility]    Social Connections     Frequency of Communication with Friends and Family: Not on file   Interpersonal Safety: Low Risk  (2024)    Interpersonal Safety     Do you feel physically and emotionally safe where you currently live?: Yes     Within the past 12 months, have you been hit, slapped, kicked or otherwise physically  "hurt by someone?: No     Within the past 12 months, have you been humiliated or emotionally abused in other ways by your partner or ex-partner?: No   Housing Stability: Low Risk  (12/6/2023)    Housing Stability     Do you have housing? : Yes     Are you worried about losing your housing?: No        Family History  Family History   Problem Relation Age of Onset    Snoring Father     Anesthesia Reaction No family hx of      Review of Systems:  Constitutional: Negative except as noted in HPI.   Eyes: Negative except as noted in HPI.   ENT: Negative except as noted in HPI.   Cardiovascular: Negative except as noted in HPI.   Respiratory: Negative except as noted in HPI.   Gastrointestinal: Negative except as noted in HPI.   Genitourinary: Negative except as noted in HPI.   Musculoskeletal: Negative except as noted in HPI.   Integumentary: Negative except as noted in HPI.   Neurological: Negative except as noted in HPI.   Psychiatric: Negative except as noted in HPI.   Endocrine: Negative except as noted in HPI.   Hematologic/Lymphatic: Negative except as noted in HPI.      Physical Exam:  /72   Pulse 74   Ht 1.753 m (5' 9\")   Wt 104.3 kg (230 lb)   SpO2 99%   BMI 33.97 kg/m    BMI:Body mass index is 33.97 kg/m .   GEN: NAD, obese  Head: Normocephalic.  EYES: PERRLA, EOMI  ENT: Oropharynx is clear, Duarte class 4+ airway.   Nasal mucosa is moist without erythema  Neck : Thyroid is within normal limits.   CV: Regular rate and rhythm, S1 & S2 positive.  LUNGS: Bilateral breathsounds heard.   ABDOMEN: Positive bowel sounds in all quadrants, soft, no rebound or guarding  MUSCULOSKELETAL: Bilateral trace leg swelling  SKIN: warm, dry, no rashes  Neurological: Alert, Gait is normal. Strength 5/5 in all extremities.  Psych: normal mood, normal affect     Labs/Studies:     Lab Results   Component Value Date    GLC 97 06/17/2024    GLC 83 06/06/2024     Lab Results   Component Value Date    HGB 13.8 05/25/2023    " HGB 13.4 11/13/2022     Lab Results   Component Value Date    BUN 23.4 (H) 06/17/2024    BUN 20.3 06/06/2024    CR 1.02 06/17/2024    CR 0.99 06/06/2024     Lab Results   Component Value Date    AST 22 06/17/2024    AST 22 06/06/2024    ALT 13 06/17/2024    ALT 14 06/06/2024    ALKPHOS 105 06/17/2024    ALKPHOS 107 06/06/2024    BILITOTAL 0.6 06/17/2024    BILITOTAL 0.7 06/06/2024     Patient verbalized understanding of these issues, agrees with the plan and all questions were answered today. Patient was given an opportuntity to voice any other symptoms or concerns not listed above. Patient did not have any other symptoms or concerns.         Sumeet Manzano DO,   Board Certified in Internal Medicine and Sleep Medicine    (Note created with Dragon voice recognition and unintended spelling errors and word substitutions may occur)

## 2024-07-09 NOTE — NURSING NOTE
"Chief Complaint   Patient presents with    Sleep Problem     Patient is present today to have his medications examined if appropriate for restless leg and sleep.        Initial /72   Pulse 74   Ht 1.753 m (5' 9\")   Wt 104.3 kg (230 lb)   SpO2 99%   BMI 33.97 kg/m   Estimated body mass index is 33.97 kg/m  as calculated from the following:    Height as of this encounter: 1.753 m (5' 9\").    Weight as of this encounter: 104.3 kg (230 lb).    Medication Reconciliation: complete    Neck circumference: 16.5 inches / 42 centimeters.    Camille Mitchell CMA on 7/9/2024 at 2:34 PM       "

## 2024-08-04 ENCOUNTER — HEALTH MAINTENANCE LETTER (OUTPATIENT)
Age: 74
End: 2024-08-04

## 2024-08-13 ENCOUNTER — TRANSFERRED RECORDS (OUTPATIENT)
Dept: HEALTH INFORMATION MANAGEMENT | Facility: CLINIC | Age: 74
End: 2024-08-13
Payer: COMMERCIAL

## 2024-08-30 ENCOUNTER — MYC MEDICAL ADVICE (OUTPATIENT)
Dept: INTERNAL MEDICINE | Facility: CLINIC | Age: 74
End: 2024-08-30
Payer: COMMERCIAL

## 2024-08-30 ENCOUNTER — NURSE TRIAGE (OUTPATIENT)
Dept: INTERNAL MEDICINE | Facility: CLINIC | Age: 74
End: 2024-08-30
Payer: COMMERCIAL

## 2024-08-30 DIAGNOSIS — R21 RASH: ICD-10-CM

## 2024-08-30 NOTE — TELEPHONE ENCOUNTER
"  Nurse Triage SBAR    Is this a 2nd Level Triage? YES, LICENSED PRACTITIONER REVIEW IS REQUIRED    Situation: pt believes he has ringworm. 12 \"hive-like\" bumps in total. Multiple areas, knee, elbow, thigh, ankle. Bumps are , not in cluster. Itchy, applying anti-itch cream to sites. Not spreading. Started 5 days ago.     Background: pt reports hx of ringworm. States provider has sent in medication for him before.     Assessment: from pt's description, not clear if it is actually ringworm however pt feels strongly this is what it is. Pt states he will attach photos to [employer] that was sent. Per chart review, PCP has prescribed clotrimazole (LOTRIMIN) 1 % external cream.     Protocol Recommended Disposition:   See PCP Within 3 Days    Recommendation: due to holiday weekend, no openings for clinic within timeframe. Pt does not want to go to . Requests message sent to provider to send in medication.      Routed to provider    Does the patient meet one of the following criteria for ADS visit consideration? 16+ years old, with an [facility] PCP     TIP  Providers, please consider if this condition is appropriate for management at one of our Acute and Diagnostic Services sites.     If patient is a good candidate, please use dotphrase <dot>triageresponse and select Refer to ADS to document.    Reason for Disposition   4 or more spots are present    Additional Information   Negative: Difficulty breathing or wheezing now   Negative: Rapid onset of swollen tongue   Negative: Rapid onset of hoarseness or cough   Negative: Very weak (can't stand)   Negative: Difficult to awaken or acting confused (e.g., disoriented, slurred speech)   Negative: Life-threatening reaction (anaphylaxis) in the past to similar substance (e.g., food, insect bite/sting, chemical, etc.) and < 2 hours since exposure   Negative: Sounds like a life-threatening emergency to the triager   Negative: Bee, wasp, or yellow jacket sting within last 24 " "hours   Negative: Taking a new medicine now or within last 3 days  (Exception: Antihistamine, decongestant or other OTC cough/cold medicines.)   Negative: Doesn't match the SYMPTOMS of hives   Negative: Swollen tongue   Negative: Widespread hives, itching, or facial swelling and onset < 2 hours of exposure to high-risk allergen (e.g., 1st dose of antibiotic, nuts, sting)   Negative: Doesn't match the SYMPTOMS of Ringworm   Negative: Patient sounds very sick or weak to the triager   Negative: Tick bite in the past month   Negative: Scalp is involved    Answer Assessment - Initial Assessment Questions  1. APPEARANCE: \"What does the rash look like?\"       One bump on hand and ankle, 3 on inner thigh, 2 bumps on knee, elbow 2 bumps.   2. LOCATION: \"Where is the rash located?\"       See above  3. NUMBER: \"How many hives are there?\"       12  4. SIZE: \"How big are the hives?\" (inches, cm, compare to coins) \"Do they all look the same or is there lots of variation in shape and size?\"       Quarter inch. All the same  5. ONSET: \"When did the hives begin?\" (Hours or days ago)       4-5 days ago, not growing    6. ITCHING: \"Does it itch?\" If Yes, ask: \"How bad is the itch?\"     - MILD: doesn't interfere with normal activities    - MODERATE-SEVERE: interferes with work, school, sleep, or other activities       Itching intense, wakes up at night. Ointment helping for short periods.   7. RECURRENT PROBLEM: \"Have you had hives before?\" If Yes, ask: \"When was the last time?\" and \"What happened that time?\"       Had ringworm, but that was a cluster. These are all separate. Itch feels the same.   8. TRIGGERS: \"Were you exposed to any new food, plant, cosmetic product or animal just before the hives began?\"      Has been outside, could be bug bites.  9. OTHER SYMPTOMS: \"Do you have any other symptoms?\" (e.g., fever, tongue swelling, difficulty breathing, abdomen pain)      No   10. PREGNANCY: \"Is there any chance you are pregnant?\" " "\"When was your last menstrual period?\"        NA    Protocols used: Hives-A-[place], Ringworm-A-    "

## 2024-09-03 NOTE — TELEPHONE ENCOUNTER
"Patient seen in Urgent Care 8/30/24. Per note from visit:  \"It looks like bug bites.    I refilled the clotrimazole if needed.     For itching- daily zyrtec allergy pill\".     MyChart sent to follow up (see 8/30/24 MyChart encounter).   "

## 2024-09-16 ENCOUNTER — TRANSFERRED RECORDS (OUTPATIENT)
Dept: HEALTH INFORMATION MANAGEMENT | Facility: CLINIC | Age: 74
End: 2024-09-16
Payer: COMMERCIAL

## 2024-10-09 ENCOUNTER — TRANSFERRED RECORDS (OUTPATIENT)
Dept: MULTI SPECIALTY CLINIC | Facility: CLINIC | Age: 74
End: 2024-10-09
Payer: COMMERCIAL

## 2024-10-09 LAB — HBA1C MFR BLD: 7.8 %

## 2024-10-13 ENCOUNTER — HEALTH MAINTENANCE LETTER (OUTPATIENT)
Age: 74
End: 2024-10-13

## 2024-10-17 ENCOUNTER — TRANSFERRED RECORDS (OUTPATIENT)
Dept: HEALTH INFORMATION MANAGEMENT | Facility: CLINIC | Age: 74
End: 2024-10-17
Payer: COMMERCIAL

## 2024-10-18 ENCOUNTER — TRANSFERRED RECORDS (OUTPATIENT)
Dept: HEALTH INFORMATION MANAGEMENT | Facility: CLINIC | Age: 74
End: 2024-10-18
Payer: COMMERCIAL

## 2024-10-18 LAB — TSH SERPL-ACNC: 2.42 UIU/ML (ref 0.45–4.5)

## 2024-10-20 ASSESSMENT — SOCIAL DETERMINANTS OF HEALTH (SDOH): HOW OFTEN DO YOU GET TOGETHER WITH FRIENDS OR RELATIVES?: ONCE A WEEK

## 2024-10-23 ENCOUNTER — ANCILLARY PROCEDURE (OUTPATIENT)
Dept: GENERAL RADIOLOGY | Facility: CLINIC | Age: 74
End: 2024-10-23
Attending: NURSE PRACTITIONER
Payer: COMMERCIAL

## 2024-10-23 ENCOUNTER — OFFICE VISIT (OUTPATIENT)
Dept: INTERNAL MEDICINE | Facility: CLINIC | Age: 74
End: 2024-10-23
Payer: COMMERCIAL

## 2024-10-23 VITALS
BODY MASS INDEX: 32.83 KG/M2 | HEART RATE: 66 BPM | SYSTOLIC BLOOD PRESSURE: 119 MMHG | DIASTOLIC BLOOD PRESSURE: 72 MMHG | HEIGHT: 71 IN | TEMPERATURE: 98.2 F | WEIGHT: 234.5 LBS | RESPIRATION RATE: 13 BRPM | OXYGEN SATURATION: 98 %

## 2024-10-23 DIAGNOSIS — E11.319 TYPE 2 DIABETES MELLITUS WITH RETINOPATHY, WITH LONG-TERM CURRENT USE OF INSULIN, MACULAR EDEMA PRESENCE UNSPECIFIED, UNSPECIFIED LATERALITY, UNSPECIFIED RETINOPATHY SEVERITY (H): ICD-10-CM

## 2024-10-23 DIAGNOSIS — G25.81 RESTLESS LEGS SYNDROME: ICD-10-CM

## 2024-10-23 DIAGNOSIS — Z00.00 ENCOUNTER FOR MEDICARE ANNUAL WELLNESS EXAM: Primary | ICD-10-CM

## 2024-10-23 DIAGNOSIS — R29.6 RECURRENT FALLS: ICD-10-CM

## 2024-10-23 DIAGNOSIS — Z79.4 TYPE 2 DIABETES MELLITUS WITH RETINOPATHY, WITH LONG-TERM CURRENT USE OF INSULIN, MACULAR EDEMA PRESENCE UNSPECIFIED, UNSPECIFIED LATERALITY, UNSPECIFIED RETINOPATHY SEVERITY (H): ICD-10-CM

## 2024-10-23 DIAGNOSIS — R19.4 CHANGE IN BOWEL HABITS: ICD-10-CM

## 2024-10-23 DIAGNOSIS — G63 POLYNEUROPATHY ASSOCIATED WITH UNDERLYING DISEASE (H): ICD-10-CM

## 2024-10-23 DIAGNOSIS — I10 ESSENTIAL HYPERTENSION: ICD-10-CM

## 2024-10-23 DIAGNOSIS — Z12.11 SCREEN FOR COLON CANCER: ICD-10-CM

## 2024-10-23 DIAGNOSIS — R26.89 IMPAIRED GAIT AND MOBILITY: ICD-10-CM

## 2024-10-23 DIAGNOSIS — K59.00 CONSTIPATION, UNSPECIFIED CONSTIPATION TYPE: ICD-10-CM

## 2024-10-23 DIAGNOSIS — Z12.5 SCREENING FOR PROSTATE CANCER: ICD-10-CM

## 2024-10-23 LAB
CREAT UR-MCNC: 132 MG/DL
MICROALBUMIN UR-MCNC: <12 MG/L
MICROALBUMIN/CREAT UR: NORMAL MG/G{CREAT}
PSA SERPL DL<=0.01 NG/ML-MCNC: 2.35 NG/ML (ref 0–6.5)
TSH SERPL DL<=0.005 MIU/L-ACNC: 2.28 UIU/ML (ref 0.3–4.2)

## 2024-10-23 PROCEDURE — 36415 COLL VENOUS BLD VENIPUNCTURE: CPT | Performed by: INTERNAL MEDICINE

## 2024-10-23 PROCEDURE — 99214 OFFICE O/P EST MOD 30 MIN: CPT | Mod: 25 | Performed by: INTERNAL MEDICINE

## 2024-10-23 PROCEDURE — 82570 ASSAY OF URINE CREATININE: CPT | Performed by: INTERNAL MEDICINE

## 2024-10-23 PROCEDURE — 82043 UR ALBUMIN QUANTITATIVE: CPT | Performed by: INTERNAL MEDICINE

## 2024-10-23 PROCEDURE — 74018 RADEX ABDOMEN 1 VIEW: CPT | Mod: TC | Performed by: RADIOLOGY

## 2024-10-23 PROCEDURE — G0008 ADMIN INFLUENZA VIRUS VAC: HCPCS | Performed by: INTERNAL MEDICINE

## 2024-10-23 PROCEDURE — G0439 PPPS, SUBSEQ VISIT: HCPCS | Performed by: INTERNAL MEDICINE

## 2024-10-23 PROCEDURE — 84443 ASSAY THYROID STIM HORMONE: CPT | Performed by: INTERNAL MEDICINE

## 2024-10-23 PROCEDURE — G0103 PSA SCREENING: HCPCS | Performed by: INTERNAL MEDICINE

## 2024-10-23 PROCEDURE — 90480 ADMN SARSCOV2 VAC 1/ONLY CMP: CPT | Performed by: INTERNAL MEDICINE

## 2024-10-23 PROCEDURE — 91320 SARSCV2 VAC 30MCG TRS-SUC IM: CPT | Performed by: INTERNAL MEDICINE

## 2024-10-23 PROCEDURE — 90662 IIV NO PRSV INCREASED AG IM: CPT | Performed by: INTERNAL MEDICINE

## 2024-10-23 ASSESSMENT — PAIN SCALES - GENERAL: PAINLEVEL_OUTOF10: NO PAIN (0)

## 2024-10-23 NOTE — PROGRESS NOTES
"  {PROVIDER CHARTING PREFERENCE:039649}    Subjective   Basim is a 74 year old, presenting for the following health issues:  Physical (Patient reports they are here for annual physical. Patient reports he tried to get a colonoscopy but it wasn't successful. )      10/23/2024     1:22 PM   Additional Questions   Roomed by Zina   Accompanied by alone         10/23/2024     1:22 PM   Patient Reported Additional Medications   Patient reports taking the following new medications none     HPI     {MA/LPN/RN Pre-Provider Visit Orders- hCG/UA/Strep (Optional):069891}  {SUPERLIST (Optional):082119}  {additonal problems for provider to add (Optional):444969}    {ROS Picklists (Optional):652232}      Objective    /72 (BP Location: Left arm, Patient Position: Sitting, Cuff Size: Adult Large)   Pulse 66   Temp 98.2  F (36.8  C)   Resp 13   Ht 1.791 m (5' 10.5\")   Wt 106.4 kg (234 lb 8 oz)   SpO2 98%   BMI 33.17 kg/m    Body mass index is 33.17 kg/m .  Physical Exam   {Exam List (Optional):976960}    {Diagnostic Test Results (Optional):531832}        Signed Electronically by: SILVIANO JAVIER MD  {Email feedback regarding this note to [email or web address]   :[id number]}  "

## 2024-10-23 NOTE — PROGRESS NOTES
Preventive Care Visit  [facility]  SILVIANO JAVIER MD, Internal Medicine  Oct 23, 2024      1. Encounter for Medicare annual wellness exam (Primary)  We updated his immunizations today.  He will follow-up with GI regarding getting another colonoscopy.    2. Impaired gait and mobility  Refer to physical therapy given his gait issues and falling.  I told him it is imperative we get him more active because falls can be devastating and he we want to keep him out of a nursing home.  - Physical Therapy  Referral; Future    3. Recurrent falls  As above.    4. Type 2 diabetes mellitus with retinopathy, with long-term current use of insulin, macular edema presence unspecified, unspecified laterality, unspecified retinopathy severity (H)  He recently had A1c just this month.  - HEMOGLOBIN A1C; Future  - Albumin Random Urine Quantitative with Creat Ratio; Future    5. Restless legs syndrome  Per neurology.    6. Essential hypertension  Blood pressure looks very good.    7. Polyneuropathy associated with underlying disease (H)  Seems stable.  Continue gabapentin.    8. Screen for colon cancer    - Colonoscopy Screening  Referral; Future    9. Constipation, unspecified constipation type  Check TSH but I suspect the Mounjaro was causing some of his bowel issues.  Per GI.  - TSH with free T4 reflex; Future    10. Screening for prostate cancer  I think 1 more PSA.  - PSA, screen; Future     Subjective   Basim is a 74 year old, presenting for the following:  Physical (Patient reports they are here for annual physical. Patient reports he tried to get a colonoscopy but it wasn't successful. )        10/23/2024     1:22 PM   Additional Questions   Roomed by Zina   Accompanied by alone         10/23/2024     1:22 PM   Patient Reported Additional Medications   Patient reports taking the following new medications none           HPI  Basim comes in today for his annual wellness.  He tells me he is having a  lot of falls and difficulty walking.  He states this has been persistent for a long time.  Unclear etiology.  He is pretty and active though.  He is able [place] walk [place]  He tells me he just does not slowly.  He occasionally will use a cane.  He has diabetes and is followed by endocrinology through the [facility] system.  A1c is coming down.  He is pleased with the Mounjaro which has caused some weight loss.  He sees his neurologist regularly for his axis legs.  He had a sleep clinic discussion and that did not go well.  He recently had a colonoscopy and the prep was not adequate.  Patient was very disillusioned with the staff at the [facility].  He is somewhat tearful when talking about that today.        Health Care Directive  Patient does not have a Health Care Directive: Discussed advance care planning with patient; information given to patient to review.      10/20/2024   General Health   How would you rate your overall physical health? (!) FAIR   Feel stress (tense, anxious, or unable to sleep) Not at all            10/20/2024   Nutrition   Diet: Diabetic            5/16/2023   Exercise   Frequency of exercise: None            10/20/2024   Social Factors   Frequency of gathering with friends or relatives Once a week   Worry food won't last until get money to buy more No   Food not last or not have enough money for food? No   Do you have housing? (Housing is defined as stable permanent housing and does not include staying ouside in a car, in a tent, in an abandoned building, in an overnight shelter, or couch-surfing.) Yes   Are you worried about losing your housing? No   Lack of transportation? No   Unable to get utilities (heat,electricity)? No            10/23/2024   Fall Risk   Gait Speed Test (Document in seconds) 7.44   Gait Speed Test Interpretation Greater than 5.01 seconds - ABNORMAL             10/20/2024   Activities of Daily Living- Home Safety   Needs help with  the following daily activites None of the above   Safety concerns in the home None of the above            10/20/2024   Dental   Dentist two times every year? Yes            10/20/2024   Hearing Screening   Hearing concerns? None of the above            10/20/2024   Driving Risk Screening   Patient/family members have concerns about driving No            10/20/2024   General Alertness/Fatigue Screening   Have you been more tired than usual lately? No            10/20/2024   Urinary Incontinence Screening   Bothered by leaking urine in past 6 months No            10/20/2024   TB Screening   Were you born outside of the [place]? No            Today's PHQ-2 Score:       10/23/2024     1:11 PM   PHQ-2 (  [employer])   Q1: Little interest or pleasure in doing things 0    Q2: Feeling down, depressed or hopeless 0    PHQ-2 Score 0    Q1: Little interest or pleasure in doing things Not at all   Q2: Feeling down, depressed or hopeless Not at all   PHQ-2 Score 0       Patient-reported           10/20/2024   Substance Use   Alcohol more than 3/day or more than 7/wk Not Applicable   Do you have a current opioid prescription? No   How severe/bad is pain from 1 to 10? 0/10 (No Pain)   Do you use any other substances recreationally? No        Social History     Tobacco Use    Smoking status: Former     Current packs/day: 0.00     Types: Cigarettes     Quit date: 8/3/1980     Years since quittin.2    Smokeless tobacco: Never   Vaping Use    Vaping status: Never Used   Substance Use Topics    Alcohol use: No    Drug use: No       ASCVD Risk   The ASCVD Risk score (Suzan DK, et al., 2019) failed to calculate for the following reasons:    The valid total cholesterol range is 130 to 320 mg/dL            Reviewed and updated as needed this visit by Provider                      Current providers sharing in care for this patient include:  Patient Care Team:  Heraclio Meng MD as PCP - General (Internal Medicine)  [facility]  "Nestor MONTEIRO MD (Inactive) as MD (Internal Medicine)  Camden Hyde MD as MD (Neurology)  Merrick Rodriguez MD as MD (Urology)  Heraclio Meng MD as Assigned PCP  Wendy Torres APRN CNP as Nurse Practitioner (Neurology)  Wendy Torres APRN CNP as Nurse Practitioner (Neurology)  Sumeet Manzano DO as Assigned Sleep Provider    The following health maintenance items are reviewed in Epic and correct as of today:  Health Maintenance   Topic Date Due    MEDICARE ANNUAL WELLNESS VISIT  05/16/2024    DIABETIC FOOT EXAM  08/17/2024    INFLUENZA VACCINE (1) 09/01/2024    COVID-19 Vaccine (10 - 2024-25 season) 09/01/2024    A1C  09/17/2024    COLORECTAL CANCER SCREENING  08/24/2024    ANNUAL REVIEW OF HM ORDERS  12/06/2024    EYE EXAM  03/26/2025    BMP  06/17/2025    LIPID  06/17/2025    FALL RISK ASSESSMENT  10/23/2025    ADVANCE CARE PLANNING  05/16/2028    DTAP/TDAP/TD IMMUNIZATION (3 - Td or Tdap) 01/31/2030    HEPATITIS C SCREENING  Completed    PHQ-2 (once per calendar year)  Completed    Pneumococcal Vaccine: 65+ Years  Completed    ZOSTER IMMUNIZATION  Completed    RSV VACCINE  Completed    AORTIC ANEURYSM SCREENING (SYSTEM ASSIGNED)  Completed    HPV IMMUNIZATION  Aged Out    MENINGITIS IMMUNIZATION  Aged Out    RSV MONOCLONAL ANTIBODY  Aged Out    MICROALBUMIN  Discontinued         Review of Systems  Constitutional, HEENT, cardiovascular, pulmonary, gi and gu systems are negative, except as otherwise noted.     Objective    Exam  /72 (BP Location: Left arm, Patient Position: Sitting, Cuff Size: Adult Large)   Pulse 66   Temp 98.2  F (36.8  C)   Resp 13   Ht 1.791 m (5' 10.5\")   Wt 106.4 kg (234 lb 8 oz)   SpO2 98%   BMI 33.17 kg/m     Estimated body mass index is 33.17 kg/m  as calculated from the following:    Height as of this encounter: 1.791 m (5' 10.5\").    Weight as of this encounter: 106.4 kg (234 lb 8 oz).    Physical Exam  GENERAL: alert and no distress.  Tearful at times.  EYES: " Eyes grossly normal to inspection, PERRL and conjunctivae and sclerae normal  HENT: ear canals and TM's normal, nose and mouth without ulcers or lesions  NECK: no adenopathy, no asymmetry, masses, or scars  RESP: lungs clear to auscultation - no rales, rhonchi or wheezes  CV: regular rate and rhythm, normal S1 S2, no S3 or S4, no murmur, click or rub, no peripheral edema  ABDOMEN: soft, nontender, no hepatosplenomegaly, no masses and bowel sounds normal  MS: no gross musculoskeletal defects noted, no edema  Diabetic foot exam: normal DP and PT pulses and no trophic changes or ulcerative lesions.  No monofilament sensation from forefoot distally bilaterally.        10/23/2024   Mini Cog   Clock Draw Score 2 Normal   3 Item Recall 3 objects recalled   Mini Cog Total Score 5                 Signed Electronically by: SILVIANO JAVIER MD

## 2024-10-23 NOTE — PATIENT INSTRUCTIONS
Patient Education   Preventive Care Advice   This is general advice given by our system to help you stay healthy. However, your care team may have specific advice just for you. Please talk to your care team about your preventive care needs.  Nutrition  Eat 5 or more servings of fruits and vegetables each day.  Try wheat bread, brown rice and whole grain pasta (instead of white bread, rice, and pasta).  Get enough calcium and vitamin D. Check the label on foods and aim for 100% of the RDA (recommended daily allowance).  Lifestyle  Exercise at least 150 minutes each week  (30 minutes a day, 5 days a week).  Do muscle strengthening activities 2 days a week. These help control your weight and prevent disease.  No smoking.  Wear sunscreen to prevent skin cancer.  Have a dental exam and cleaning every 6 months.  Yearly exams  See your health care team every year to talk about:  Any changes in your health.  Any medicines your care team has prescribed.  Preventive care, family planning, and ways to prevent chronic diseases.  Shots (vaccines)   HPV shots (up to age 26), if you've never had them before.  Hepatitis B shots (up to age 59), if you've never had them before.  COVID-19 shot: Get this shot when it's due.  Flu shot: Get a flu shot every year.  Tetanus shot: Get a tetanus shot every 10 years.  Pneumococcal, hepatitis A, and RSV shots: Ask your care team if you need these based on your risk.  Shingles shot (for age 50 and up)  General health tests  Diabetes screening:  Starting at age 35, Get screened for diabetes at least every 3 years.  If you are younger than age 35, ask your care team if you should be screened for diabetes.  Cholesterol test: At age 39, start having a cholesterol test every 5 years, or more often if advised.  Bone density scan (DEXA): At age 50, ask your care team if you should have this scan for osteoporosis (brittle bones).  Hepatitis C: Get tested at least once in your life.  STIs (sexually  transmitted infections)  Before age 24: Ask your care team if you should be screened for STIs.  After age 24: Get screened for STIs if you're at risk. You are at risk for STIs (including HIV) if:  You are sexually active with more than one person.  You don't use condoms every time.  You or a partner was diagnosed with a sexually transmitted infection.  If you are at risk for HIV, ask about PrEP medicine to prevent HIV.  Get tested for HIV at least once in your life, whether you are at risk for HIV or not.  Cancer screening tests  Cervical cancer screening: If you have a cervix, begin getting regular cervical cancer screening tests starting at age 21.  Breast cancer scan (mammogram): If you've ever had breasts, begin having regular mammograms starting at age 40. This is a scan to check for breast cancer.  Colon cancer screening: It is important to start screening for colon cancer at age 45.  Have a colonoscopy test every 10 years (or more often if you're at risk) Or, ask your provider about stool tests like a FIT test every year or Cologuard test every 3 years.  To learn more about your testing options, visit:   .  For help making a decision, visit:   https://bit.ly/cj23350.  Prostate cancer screening test: If you have a prostate, ask your care team if a prostate cancer screening test (PSA) at age 55 is right for you.  Lung cancer screening: If you are a current or former smoker ages 50 to 80, ask your care team if ongoing lung cancer screenings are right for you.  For informational purposes only. Not to replace the advice of your health care provider. Copyright   2023 [facility]. All rights reserved. Clinically reviewed by the [facility]. [employer] [id number] - REV 01/24.  Preventing Falls: Care Instructions  Injuries and health problems such as trouble walking or poor eyesight can increase your risk of falling. So can some medicines. But there are things you can do to help  "prevent falls. You can exercise to get stronger. You can also arrange your home to make it safer.    Talk to your doctor about the medicines you take. Ask if any of them increase the risk of falls and whether they can be changed or stopped.   Try to exercise regularly. It can help improve your strength and balance. This can help lower your risk of falling.         Practice fall safety and prevention.   Wear low-heeled shoes that fit well and give your feet good support. Talk to your doctor if you have foot problems that make this hard.  Carry a cellphone or wear a medical alert device that you can use to call for help.  Use stepladders instead of chairs to reach high objects. Don't climb if you're at risk for falls. Ask for help, if needed.  Wear the correct eyeglasses, if you need them.        Make your home safer.   Remove rugs, cords, clutter, and furniture from walkways.  Keep your house well lit. Use night-lights in hallways and bathrooms.  Install and use sturdy handrails on stairways.  Wear nonskid footwear, even inside. Don't walk barefoot or in socks without shoes.        Be safe outside.   Use handrails, curb cuts, and ramps whenever possible.  Keep your hands free by using a shoulder bag or backpack.  Try to walk in well-lit areas. Watch out for uneven ground, changes in pavement, and debris.  Be careful in the winter. Walk on the grass or gravel when sidewalks are slippery. Use de-icer on steps and walkways. Add non-slip devices to shoes.    Put grab bars and nonskid mats in your shower or tub and near the toilet. Try to use a shower chair or bath bench when bathing.   Get into a tub or shower by putting in your weaker leg first. Get out with your strong side first. Have a phone or medical alert device in the bathroom with you.   Where can you learn more?  Go to https://www.[employer].net/patiented  Enter G117 in the search box to learn more about \"Preventing Falls: Care Instructions.\"  Current as of: " July 17, 2023  Content Version: 14.2 2024 [facility] [employer].   Care instructions adapted under license by your healthcare professional. If you have questions about a medical condition or this instruction, always ask your healthcare professional. [employer] disclaims any warranty or liability for your use of this information.

## 2024-11-05 ENCOUNTER — TRANSFERRED RECORDS (OUTPATIENT)
Dept: HEALTH INFORMATION MANAGEMENT | Facility: CLINIC | Age: 74
End: 2024-11-05
Payer: COMMERCIAL

## 2024-12-02 ENCOUNTER — THERAPY VISIT (OUTPATIENT)
Dept: PHYSICAL THERAPY | Facility: REHABILITATION | Age: 74
End: 2024-12-02
Payer: COMMERCIAL

## 2024-12-02 DIAGNOSIS — R26.89 IMPAIRED GAIT AND MOBILITY: ICD-10-CM

## 2024-12-02 PROCEDURE — 97161 PT EVAL LOW COMPLEX 20 MIN: CPT | Mod: GP | Performed by: PHYSICAL THERAPIST

## 2024-12-02 PROCEDURE — 97110 THERAPEUTIC EXERCISES: CPT | Mod: GP | Performed by: PHYSICAL THERAPIST

## 2024-12-02 NOTE — PROGRESS NOTES
PHYSICAL THERAPY EVALUATION  Type of Visit: Evaluation       Fall Risk Screen:  Fall screen completed by: PT  Have you fallen 2 or more times in the past year?: Yes  Have you fallen and had an injury in the past year?: No  Timed Up and Go score (seconds): 17.13 sec  Is patient a fall risk?: Yes  Fall screen comments: patient here in PT for impaired gait and moblity    Subjective      Condition type:  Chronic (continuous duration <3 months)  Cause of current episode:  Unspecified     Nature of treatment:  Rehabilitative  Functional ability:  Severe functional limitations  Documented POC (choose all that apply):  Measurable short and long term/discharge treatment goals related to physical and functional deficits.;Frequency of treatment visits and treatment activities to address deficit areas.;Patient agrees to program participation including home program  Briefly describe symptoms:  unstable gait, difficulty with transfers, knees won't bend, difficulty going down curbs and stairs, weakness, difficulty with balancing.  How did the symptoms start:  balance started to be more difficult, he started to have to hold onto a ledge while walking around the house, then stairs started to become more and more difficult.  Average pain/intensity last 24 hours:  0/10  Average pain/intensity past week:  6/10  Frequency of Symptoms:  Constantly (% of the time)  Symptom impact on ADLs:  Quite a bit  Condition change since eval:  N/A (first visit)  General health reported by patient:  Fair      Presenting condition or subjective complaint: (Patient-Rptd) Slow mobility, balance leg muscles  Date of onset: 10/23/24 (date of PT order)    Relevant medical history: (Patient-Rptd) Cancer   Active Ambulatory Problems     Diagnosis Date Noted    B12 deficiency 11/19/2016    Dyslipidemia 11/19/2016    Essential hypertension 11/19/2016    History of bladder cancer 11/19/2016    Hyperlipidemia 06/12/2018    RBBB (right bundle branch block)  2015    Restless legs syndrome 10/12/2012    Type 2 diabetes mellitus with retinopathy, with long-term current use of insulin (H) 2016    History of gastric bypass 2016    Male erectile disorder 2020    Peripheral neuropathy 10/12/2020    Polyneuropathy associated with underlying disease (H) 2021    Nonproliferative diabetic retinopathy (H) 2022     Resolved Ambulatory Problems     Diagnosis Date Noted    No Resolved Ambulatory Problems     Past Medical History:   Diagnosis Date    Cancer (H)     Diabetes mellitus (H)     Disease of thyroid gland     Hordeolum externum     Hypertension     Intestinal malabsorption     Obesity     Restless leg     Vitamin B 12 deficiency        Dates & types of surgery:    Past Surgical History:   Procedure Laterality Date    CATARACT EXTRACTION, BILATERAL      CYSTOSCOPY, TRANSURETHRAL RESECTION (TUR) TUMOR BLADDER, COMBINED  2010    GASTRIC BYPASS  1983    stapling    KERATOPLASTY  ,    VASCULAR SURGERY      varicose vein stripping        Prior diagnostic imaging/testing results:       Prior therapy history for the same diagnosis, illness or injury: (Patient-Rptd) No      Prior Level of Function  Transfers: Independent  Ambulation: Independent  ADL: Independent    Living Environment  Social support: (Patient-Rptd) Alone   Type of home: (Patient-Rptd) [facility]   Stairs to enter the home: (Patient-Rptd) No       Ramp: (Patient-Rptd) No   Stairs inside the home: (Patient-Rptd) No       Help at home: (Patient-Rptd) None  Equipment owned: (Patient-Rptd) Straight Cane; Grab bars     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  Hobbies/Interests: (Patient-Rptd) scrabble, audio books, townhome [employer]     Patient goals for therapy: (Patient-Rptd) step off a curb, improve balance    Pain assessment: Pain present  Location: bilateral knees/Ratin-6/10     Objective      Cognitive Status  Examination  Orientation: Oriented to person, place and time   Level of Consciousness: Alert  Follows Commands and Answers Questions: 100% of the time  Personal Safety and Judgement: Intact  Memory: Intact    POSTURE: Sitting Posture: Rounded shoulders, Forward head, Thoracic kyphosis increased  PALPATION: NT  RANGE OF MOTION:   STRENGTH:   Pain: - none + mild ++ moderate +++ severe  Strength Scale: 0-5/5 Left Right   Hip Flexion 4+ 4+   Hip Extension 4+ 4+   Hip Abduction 4 4-   Hip Adduction 4 4   Hip Internal Rotation 4+ 4+   Hip External Rotation 4+ 4+   Knee Flexion 4+ 4, ++ (mod)   Knee Extension 4, ++ (mod) 5, + (mild)   Ankle DF 3+ 4   Ankle PF Unable to perform single leg heel raise Unable to perform single leg heel raise       BED MOBILITY:  NT    TRANSFERS: Independent    WHEELCHAIR MOBILITY: NA    GAIT:   Level of Lyles: Independent  Assistive Device(s): None, Cane (single end)  Gait Deviations: Base of support increased  Stride length decreased  Luda decreased  Visibly unsteady       BALANCE:     SPECIAL TESTS  Functional Gait Assessment (FGA)      10 Meter Walk Test (Comfortable)     10 Meter Walk Test (Fast)     6 Minute Walk Test (6MWT)           Milner Balance Scale (BBS)     5 Times Sit-to-Stand (5TSTS)       Dynamic Gait Index (DGI)     Timed Up and Go (TUG) - sec 17.13 sec   Single Leg Stance Right (sec) < 1   Single Leg Stance Left (sec) < 1   Modified CTSIB Conditions (sec) Cond 1: 30  Cond 2:  6  Cond 4:   Cond 5 :    Romberg  (sec)    Sharpened Romberg (sec) 8 with arms outstretched   30 Second Sit to Stand (reps/height) 0 unable with UE support   Mini-BESTest                  Assessment & Plan   CLINICAL IMPRESSIONS  Medical Diagnosis: Impaired gait and mobility    Treatment Diagnosis: Impaired gait and mobility   Impression/Assessment: Patient is a 74 year old male with complaints LE weakness, difficulty with balance, unsteady gait, and history of multiple falls.  The following  significant findings have been identified: Pain, Decreased ROM/flexibility, Decreased joint mobility, Decreased strength, Impaired balance, Decreased proprioception, Impaired sensation, Impaired gait, Impaired muscle performance, Decreased activity tolerance, and Impaired posture. These impairments interfere with their ability to perform self care tasks, work tasks, recreational activities, household chores, household mobility, and community mobility as compared to previous level of function.     Clinical Decision Making (Complexity):  Clinical Presentation: Stable/Uncomplicated  Clinical Presentation Rationale: based on medical and personal factors listed in PT evaluation  Clinical Decision Making (Complexity): Low complexity    PLAN OF CARE  Treatment Interventions:  Interventions: Gait Training, Manual Therapy, Neuromuscular Re-education, Therapeutic Activity, Therapeutic Exercise, Self-Care/Home Management    Long Term Goals     PT Goal 1  Goal Identifier: HEP  Goal Description: Patient will be independent with HEP and self management of symptoms  Rationale: to maximize safety and independence with performance of ADLs and functional tasks;to maximize safety and independence within the home  Goal Progress: initial  Target Date: 03/02/25  PT Goal 2  Goal Identifier: proprioceptive balance  Goal Description: Patient will demonstrate improvement in proprioceptive balance by standing with feet together on firm ground with eyes closed for 30 seconds  Rationale: to maximize safety and independence with performance of ADLs and functional tasks  Goal Progress: initial at evaluation 6 seconds.  Target Date: 03/02/25  PT Goal 3  Goal Identifier: fall risk  Goal Description: Patient will demonstrate improved functional gait and decreased risk of falls by performins the TUG in < 13 seconds.  Rationale: to maximize safety and independence with performance of ADLs and functional tasks;to maximize safety and independence within  the home  Goal Progress: initial at evaluation 17.13  Target Date: 03/02/25  PT Goal 4  Goal Identifier: FGA  Goal Description: Patient will demonstrate functional dynamic balance and low risk for falls by scoring 22/30 or more on the FGA  Rationale: to maximize safety and independence with performance of ADLs and functional tasks;to maximize safety and independence within the home  Goal Progress: initial  Target Date: 03/02/25  PT Goal 5  Goal Identifier: curbs  Goal Description: Patient will demonstrate safe ascending and descending of curbs with SEC  Rationale: to maximize safety and independence within the community  Goal Progress: initial  Target Date: 03/02/25      Frequency of Treatment: 1x/week.  Duration of Treatment: 90 days    Recommended Referrals to Other Professionals: not at this time.  Education Assessment:   Learner/Method: Patient;No Barriers to Learning    Risks and benefits of evaluation/treatment have been explained.   Patient/Family/caregiver agrees with Plan of Care.     Evaluation Time:     PT Eval, Low Complexity Minutes (94529): 35       Signing Clinician: KANG [facility]                                                                                   OUTPATIENT PHYSICAL THERAPY      PLAN OF TREATMENT FOR OUTPATIENT REHABILITATION   Patient's Last Name, First Name, M.I.  RicciEfren OLIVARES YOB: 1950   Provider's Name   [facility]   Medical Record No.  [id number]     Onset Date: 10/23/24 (date of PT order)  Start of Care Date: 12/02/24     Medical Diagnosis:  Impaired gait and mobility      PT Treatment Diagnosis:  Impaired gait and mobility Plan of Treatment  Frequency/Duration: 1x/week./ 90 days    Certification date from 12/02/24 to 03/02/25         See note for plan of treatment details and functional goals     Kendall Olivera, KANG                         I CERTIFY THE NEED FOR THESE  SERVICES FURNISHED UNDER        THIS PLAN OF TREATMENT AND WHILE UNDER MY CARE     (Physician attestation of this document indicates review and certification of the therapy plan).              Referring Provider:  Heraclio Meng MD    Initial Assessment  See Epic Evaluation- Start of Care Date: 12/02/24

## 2024-12-03 ENCOUNTER — TRANSFERRED RECORDS (OUTPATIENT)
Dept: HEALTH INFORMATION MANAGEMENT | Facility: CLINIC | Age: 74
End: 2024-12-03
Payer: COMMERCIAL

## 2024-12-10 ENCOUNTER — THERAPY VISIT (OUTPATIENT)
Dept: PHYSICAL THERAPY | Facility: REHABILITATION | Age: 74
End: 2024-12-10
Payer: COMMERCIAL

## 2024-12-10 DIAGNOSIS — R26.89 IMPAIRED GAIT AND MOBILITY: Primary | ICD-10-CM

## 2024-12-10 PROCEDURE — 97112 NEUROMUSCULAR REEDUCATION: CPT | Mod: GP | Performed by: PHYSICAL THERAPIST

## 2024-12-10 PROCEDURE — 97110 THERAPEUTIC EXERCISES: CPT | Mod: GP | Performed by: PHYSICAL THERAPIST

## 2024-12-17 ENCOUNTER — THERAPY VISIT (OUTPATIENT)
Dept: PHYSICAL THERAPY | Facility: REHABILITATION | Age: 74
End: 2024-12-17
Payer: COMMERCIAL

## 2024-12-17 DIAGNOSIS — R26.89 IMPAIRED GAIT AND MOBILITY: Primary | ICD-10-CM

## 2024-12-17 PROCEDURE — 97110 THERAPEUTIC EXERCISES: CPT | Mod: GP | Performed by: PHYSICAL THERAPIST

## 2024-12-17 PROCEDURE — 97112 NEUROMUSCULAR REEDUCATION: CPT | Mod: GP | Performed by: PHYSICAL THERAPIST

## 2024-12-23 ENCOUNTER — THERAPY VISIT (OUTPATIENT)
Dept: PHYSICAL THERAPY | Facility: REHABILITATION | Age: 74
End: 2024-12-23
Payer: COMMERCIAL

## 2024-12-23 DIAGNOSIS — R26.89 IMPAIRED GAIT AND MOBILITY: Primary | ICD-10-CM

## 2024-12-23 PROCEDURE — 97112 NEUROMUSCULAR REEDUCATION: CPT | Mod: GP | Performed by: PHYSICAL THERAPIST

## 2024-12-23 PROCEDURE — 97110 THERAPEUTIC EXERCISES: CPT | Mod: GP | Performed by: PHYSICAL THERAPIST

## 2025-01-01 DIAGNOSIS — E11.9 TYPE 2 DIABETES MELLITUS WITHOUT COMPLICATION, WITH LONG-TERM CURRENT USE OF INSULIN (H): ICD-10-CM

## 2025-01-01 DIAGNOSIS — Z79.4 TYPE 2 DIABETES MELLITUS WITHOUT COMPLICATION, WITH LONG-TERM CURRENT USE OF INSULIN (H): ICD-10-CM

## 2025-01-02 RX ORDER — LISINOPRIL AND HYDROCHLOROTHIAZIDE 12.5; 2 MG/1; MG/1
TABLET ORAL
Qty: 90 TABLET | Refills: 2 | Status: SHIPPED | OUTPATIENT
Start: 2025-01-02

## 2025-01-23 ENCOUNTER — THERAPY VISIT (OUTPATIENT)
Dept: PHYSICAL THERAPY | Facility: REHABILITATION | Age: 75
End: 2025-01-23
Payer: COMMERCIAL

## 2025-01-23 DIAGNOSIS — R26.89 IMPAIRED GAIT AND MOBILITY: Primary | ICD-10-CM

## 2025-01-25 ENCOUNTER — HEALTH MAINTENANCE LETTER (OUTPATIENT)
Age: 75
End: 2025-01-25

## 2025-02-10 ENCOUNTER — THERAPY VISIT (OUTPATIENT)
Dept: PHYSICAL THERAPY | Facility: REHABILITATION | Age: 75
End: 2025-02-10
Payer: COMMERCIAL

## 2025-02-10 DIAGNOSIS — R26.89 IMPAIRED GAIT AND MOBILITY: Primary | ICD-10-CM

## 2025-02-10 PROCEDURE — 97110 THERAPEUTIC EXERCISES: CPT | Mod: GP | Performed by: PHYSICAL THERAPIST

## 2025-02-10 PROCEDURE — 97112 NEUROMUSCULAR REEDUCATION: CPT | Mod: GP | Performed by: PHYSICAL THERAPIST

## 2025-02-13 ENCOUNTER — TRANSFERRED RECORDS (OUTPATIENT)
Dept: HEALTH INFORMATION MANAGEMENT | Facility: CLINIC | Age: 75
End: 2025-02-13
Payer: COMMERCIAL

## 2025-02-15 ENCOUNTER — TRANSFERRED RECORDS (OUTPATIENT)
Dept: HEALTH INFORMATION MANAGEMENT | Facility: CLINIC | Age: 75
End: 2025-02-15
Payer: COMMERCIAL

## 2025-02-19 PROBLEM — D12.6 ADENOMATOUS POLYP OF COLON: Status: ACTIVE | Noted: 2025-02-19

## 2025-02-20 ENCOUNTER — PATIENT OUTREACH (OUTPATIENT)
Dept: GASTROENTEROLOGY | Facility: CLINIC | Age: 75
End: 2025-02-20
Payer: COMMERCIAL

## 2025-05-03 ENCOUNTER — MYC MEDICAL ADVICE (OUTPATIENT)
Dept: INTERNAL MEDICINE | Facility: CLINIC | Age: 75
End: 2025-05-03
Payer: COMMERCIAL

## 2025-05-03 ENCOUNTER — HEALTH MAINTENANCE LETTER (OUTPATIENT)
Age: 75
End: 2025-05-03

## 2025-05-21 ENCOUNTER — TRANSFERRED RECORDS (OUTPATIENT)
Dept: MULTI SPECIALTY CLINIC | Facility: CLINIC | Age: 75
End: 2025-05-21

## 2025-05-21 LAB — RETINOPATHY: NORMAL

## 2025-07-10 ENCOUNTER — OFFICE VISIT (OUTPATIENT)
Dept: INTERNAL MEDICINE | Facility: CLINIC | Age: 75
End: 2025-07-10
Payer: COMMERCIAL

## 2025-07-10 VITALS
WEIGHT: 238.4 LBS | SYSTOLIC BLOOD PRESSURE: 100 MMHG | HEART RATE: 65 BPM | DIASTOLIC BLOOD PRESSURE: 70 MMHG | OXYGEN SATURATION: 97 % | BODY MASS INDEX: 33.38 KG/M2 | RESPIRATION RATE: 19 BRPM | HEIGHT: 71 IN | TEMPERATURE: 97.5 F

## 2025-07-10 DIAGNOSIS — H57.813 BROW PTOSIS, BILATERAL: ICD-10-CM

## 2025-07-10 DIAGNOSIS — E11.319 TYPE 2 DIABETES MELLITUS WITH RETINOPATHY, WITH LONG-TERM CURRENT USE OF INSULIN, MACULAR EDEMA PRESENCE UNSPECIFIED, UNSPECIFIED LATERALITY, UNSPECIFIED RETINOPATHY SEVERITY (H): ICD-10-CM

## 2025-07-10 DIAGNOSIS — I10 ESSENTIAL HYPERTENSION: ICD-10-CM

## 2025-07-10 DIAGNOSIS — Z01.818 PREOP GENERAL PHYSICAL EXAM: Primary | ICD-10-CM

## 2025-07-10 DIAGNOSIS — Z79.4 TYPE 2 DIABETES MELLITUS WITH RETINOPATHY, WITH LONG-TERM CURRENT USE OF INSULIN, MACULAR EDEMA PRESENCE UNSPECIFIED, UNSPECIFIED LATERALITY, UNSPECIFIED RETINOPATHY SEVERITY (H): ICD-10-CM

## 2025-07-10 LAB
EST. AVERAGE GLUCOSE BLD GHB EST-MCNC: 148 MG/DL
HBA1C MFR BLD: 6.8 % (ref 0–5.6)
HGB BLD-MCNC: 14 G/DL (ref 13.3–17.7)
MCV RBC AUTO: 80 FL (ref 78–100)

## 2025-07-10 NOTE — PROGRESS NOTES
Preoperative Evaluation  [facility]  [address] [place] [address]  Phone: [phone]  Fax: [phone]  Primary Provider: SILVIANO JAVIER MD  Pre-op Performing Provider: SILVIANO JAVIER MD  Jul 10, 2025             7/6/2025   Surgical Information   What procedure is being done? Eye lid lift   Facility or Hospital where procedure/surgery will be performed: [facility]   Who is doing the procedure / surgery? Dr.m. Beckham   Date of surgery / procedure: July 24   Time of surgery / procedure: 11 am   Where do you plan to recover after surgery? at home alone     Fax number for surgical facility: [phone]    Assessment & Plan     The proposed surgical procedure is considered LOW risk.    1. Preop general physical exam (Primary)  Proceed with surgery as planned.  He was given perioperative med management instructions.  - HEMOGLOBIN A1C; Future  - Hemoglobin; Future    2. Brow ptosis, bilateral  We discussed what to expect perioperatively.    3. Type 2 diabetes mellitus with retinopathy, with long-term current use of insulin, macular edema presence unspecified, unspecified laterality, unspecified retinopathy severity (H)  Will check his A1c.  He is getting a new endocrinologist yet again.  Continue regimen for now.  He will hold his Mounjaro starting today.  - HEMOGLOBIN A1C; Future    4. Essential hypertension  Blood pressure well-controlled.  He will hold his lisinopril hydrochlorothiazide the morning of procedure.  - BASIC METABOLIC PANEL; Future             - No identified additional risk factors other than previously addressed    Preoperative Medication Instructions  Antiplatelet or Anticoagulation Medication Instructions   - aspirin: stop today per surgeons instructions    Additional Medication Instructions   - Herbal medications and vitamins: DO NOT TAKE 14 days prior to surgery.   - Long acting insulin (e.g. glargine, detemir): Take  half of your usual dose the night prior to surgery   - metformin: DO NOT TAKE  night prior to, or the day of surgery.   - GLP-1 Injectable (exenitide, liraglutide, semaglutide, dulaglutide, etc.): DO NOT TAKE 7 days before surgery DO NOT TAKE ANY UNTIL AFTER SURGERY    Recommendation  Approval given to proceed with proposed procedure, without further diagnostic evaluation.        Subjective   Basim is a 74 year old, presenting for the following:  Pre-Op Exam (07/24/2024 Eyelid Surgery )          7/10/2025     1:41 PM   Additional Questions   Roomed by FRANTZ New BSN     Via the Health Maintenance questionnaire, the patient has reported the following services have been completed -Eye Exam: Sara 2025-05-21, this information has been sent to the abstraction team.  HPI: Basim has bilateral brow ptosis and needs a brow lift because it is affecting his field of vision.  He is going to undergo that in a couple weeks.  He is in his usual state of health.  His sugars have been well-controlled.  He denies somatic concerns for me today.          7/6/2025   Pre-Op Questionnaire   Have you ever had a heart attack or stroke? No   Have you ever had surgery on your heart or blood vessels, such as a stent placement, a coronary artery bypass, or surgery on an artery in your head, neck, heart, or legs? No   Do you have chest pain with activity? No   Do you have a history of heart failure? No   Do you currently have a cold, bronchitis or symptoms of other infection? No   Do you have a cough, shortness of breath, or wheezing? No   Do you or anyone in your family have previous history of blood clots? No   Do you or does anyone in your family have a serious bleeding problem such as prolonged bleeding following surgeries or cuts? No   Have you ever had problems with anemia or been told to take iron pills? No   Have you had any abnormal blood loss such as black, tarry or bloody stools? No   Have you ever had a blood transfusion? No   Are you  willing to have a blood transfusion if it is medically needed before, during, or after your surgery? Yes   Have you or any of your relatives ever had problems with anesthesia? No   Do you have sleep apnea, excessive snoring or daytime drowsiness? No   Do you have any artifical heart valves or other implanted medical devices like a pacemaker, defibrillator, or continuous glucose monitor? No   Do you have artificial joints? No   Are you allergic to latex? No     Advance Care Planning    Patient states has Health Care Directive and will send to [employer].    Preoperative Review of    reviewed - controlled substances reflected in medication list.          Patient Active Problem List    Diagnosis Date Noted    Adenomatous polyp of colon 02/19/2025     Priority: Medium    Impaired gait and mobility 12/02/2024     Priority: Medium    Nonproliferative diabetic retinopathy (H) 05/09/2022     Priority: Medium    Polyneuropathy associated with underlying disease 11/17/2021     Priority: Medium    Peripheral neuropathy 10/12/2020     Priority: Medium    Male erectile disorder 01/16/2020     Priority: Medium    Hyperlipidemia 06/12/2018     Priority: Medium    B12 deficiency 11/19/2016     Priority: Medium    Dyslipidemia 11/19/2016     Priority: Medium    Essential hypertension 11/19/2016     Priority: Medium    History of bladder cancer 11/19/2016     Priority: Medium    Type 2 diabetes mellitus with retinopathy, with long-term current use of insulin (H) 11/19/2016     Priority: Medium    History of gastric bypass 11/19/2016     Priority: Medium    RBBB (right bundle branch block) 04/01/2015     Priority: Medium    Restless legs syndrome 10/12/2012     Priority: Medium     Overview:   onset about 2011  current rx with    ropinirole - good benefit   gabapentine - ? benefit          Past Medical History:   Diagnosis Date    Cancer (H) 2010    bladder    Diabetes mellitus (H)     Disease of thyroid gland      Hordeolum externum     Hypertension     Intestinal malabsorption     Obesity     Restless leg     Vitamin B 12 deficiency      Past Surgical History:   Procedure Laterality Date    CATARACT EXTRACTION, BILATERAL  11/13    CYSTOSCOPY, TRANSURETHRAL RESECTION (TUR) TUMOR BLADDER, COMBINED  11/2010    GASTRIC BYPASS  1983    stapling    KERATOPLASTY  2007,2010    VASCULAR SURGERY      varicose vein stripping     Current Outpatient Medications   Medication Sig Dispense Refill    aspirin 81 MG EC tablet Take 81 mg by mouth daily      atorvastatin (LIPITOR) 20 MG tablet Take 1 tablet (20 mg) by mouth daily 90 tablet 3    cyanocobalamin (CYANOCOBALAMIN) 1000 MCG/ML injection Inject 1 mL (1,000 mcg) into the muscle every 30 days Take on the 26th of every month. 30 mL 3    doxycycline hyclate (VIBRAMYCIN) 50 MG capsule Take 50 mg by mouth 2 times daily As needed by mouth for scalp flare ups (Patient taking differently: Take 50 mg by mouth 2 times daily as needed. As needed by mouth for scalp flare ups)      gabapentin (NEURONTIN) 300 MG capsule TAKE 4 CAPSULES BY MOUTH NIGHTLY. 360 capsule 3    lisinopril-hydrochlorothiazide (ZESTORETIC) 20-12.5 MG tablet TAKE 1 TABLET BY MOUTH EVERY DAY 90 tablet 2    metFORMIN (GLUCOPHAGE) 500 MG tablet Take 1,000 mg by mouth 2 times daily (with meals)      MOUNJARO 15 MG/0.5ML pen Inject 15 mg Subcutaneous once a week      rOPINIRole (REQUIP) 1 MG tablet Take 7-8 mg by mouth At Bedtime      TRESIBA FLEXTOUCH 200 UNIT/ML pen INJECT 40 UNITS SUBCUTANEOUS ONCE DAILY (Patient taking differently: Inject 32-55 Units subcutaneously daily.) 18 mL 1    vitamin D3 (CHOLECALCIFEROL) 50 mcg (2000 units) tablet Take 50 mcg by mouth every morning      ketoconazole (NIZORAL) 2 % external cream Apply topically daily (Patient not taking: Reported on 7/10/2025) 15 g 0       Allergies   Allergen Reactions    Iodide (Obsolete) Hives     Patient states new version may have not have side effects.        Social  "History     Tobacco Use    Smoking status: Former     Current packs/day: 0.00     Types: Cigarettes     Quit date: 8/3/1980     Years since quittin.9    Smokeless tobacco: Never   Substance Use Topics    Alcohol use: No     Family History   Problem Relation Age of Onset    Snoring Father     Anesthesia Reaction No family hx of      History   Drug Use No             Review of Systems  Constitutional, HEENT, cardiovascular, pulmonary, gi and gu systems are negative, except as otherwise noted.    Objective    /70 (BP Location: Left arm, Patient Position: Sitting, Cuff Size: Adult Regular)   Pulse 65   Temp 97.5  F (36.4  C) (Tympanic)   Resp 19   Ht 1.791 m (5' 10.5\")   Wt 108.1 kg (238 lb 6.4 oz)   SpO2 97%   BMI 33.72 kg/m     Estimated body mass index is 33.72 kg/m  as calculated from the following:    Height as of this encounter: 1.791 m (5' 10.5\").    Weight as of this encounter: 108.1 kg (238 lb 6.4 oz).  Physical Exam  GENERAL: alert and no distress  EYES: Eyes grossly normal to inspection, PERRL and conjunctivae and sclerae normal  HENT: ear canals and TM's normal, nose and mouth without ulcers or lesions  NECK: no adenopathy, no asymmetry, masses, or scars  RESP: lungs clear to auscultation - no rales, rhonchi or wheezes  CV: regular rate and rhythm, normal S1 S2, no S3 or S4, no murmur, click or rub, no peripheral edema  ABDOMEN: soft, nontender, no hepatosplenomegaly, no masses and bowel sounds normal  MS: no gross musculoskeletal defects noted, no edema  SKIN: no suspicious lesions or rashes  NEURO: Normal strength and tone, mentation intact and speech normal  PSYCH: mentation appears normal, affect normal/bright    No results for input(s): \"HGB\", \"PLT\", \"INR\", \"NA\", \"POTASSIUM\", \"CR\", \"A1C\" in the last 8760 hours.     Diagnostics  Labs pending at this time.  Results will be reviewed when available.   No EKG required for low risk surgery (cataract, skin procedure, breast biopsy, " etc).    Revised Cardiac Risk Index (RCRI)  The patient has the following serious cardiovascular risks for perioperative complications:   - Diabetes Mellitus (on Insulin) = 1 point     RCRI Interpretation: 1 point: Class II (low risk - 0.9% complication rate)         Signed Electronically by: SILVIANO JAVIER MD  A copy of this evaluation report is provided to the requesting physician.

## 2025-07-10 NOTE — PATIENT INSTRUCTIONS
How to Take Your Medication Before Surgery  Preoperative Medication Instructions   Antiplatelet or Anticoagulation Medication Instructions   - aspirin: stop today per surgeons instructions    Additional Medication Instructions   - Herbal medications and vitamins: DO NOT TAKE 14 days prior to surgery.   - Long acting insulin (e.g. glargine, detemir): Take half of your usual dose the night prior to surgery   - metformin: DO NOT TAKE  night prior to, or the day of surgery.   - GLP-1 Injectable (exenitide, liraglutide, semaglutide, dulaglutide, etc.): DO NOT TAKE 7 days before surgery DO NOT TAKE ANY UNTIL AFTER SURGERY   Morning of procedure, please do NOT TAKE ANY MEDS.  You may take them when you get home.    Patient Education   Preparing for Your Surgery  For Adults  Getting started  In most cases, a nurse will call to review your health history and instructions. They will give you an arrival time based on your scheduled surgery time. Please be ready to share:  Your doctor's clinic name and phone number  Your medical, surgical, and anesthesia history  A list of allergies and sensitivities  A list of medicines, including herbal treatments and over-the-counter drugs  Whether the patient has a legal guardian (ask how to send us the papers in advance)  Note: You may not receive a call if you were seen at our PAC (Preoperative Assessment Center).  Please tell us if you're pregnant--or if there's any chance you might be pregnant. Some surgeries may injure a fetus (unborn baby), so they require a pregnancy test. Surgeries that are safe for a fetus don't always need a test, and you can choose whether to have one.   Preparing for surgery  Within 10 to 30 days of surgery: Have a pre-op exam (sometimes called an H&P, or History and Physical). This can be done at a clinic or pre-operative center.  If you're having a , you may not need this exam. Talk to your care team.  At your pre-op exam, talk to your care team about  all medicines you take. (This includes CBD oil and any drugs, such as THC, marijuana, and other forms of cannabis.) If you need to stop any medicine before surgery, ask when to start taking it again.  This is for your safety. Many medicines and drugs can make you bleed too much during surgery. Some change how well surgery (anesthesia) drugs work.  Call your insurance company to let them know you're having surgery. (If you don't have insurance, call [phone].)  Call your clinic if there's any change in your health. This includes a scrape or scratch near the surgery site, or any signs of a cold (sore throat, runny nose, cough, rash, fever).  Eating and drinking guidelines  For your safety: Unless your surgeon tells you otherwise, follow the guidelines below.  Eat and drink as normal until 8 hours before you arrive for surgery. After that, no food or milk. You can spit out gum when you arrive.  Drink clear liquids until 2 hours before you arrive. These are liquids you can see through, like water, Gatorade, and Propel Water. They also include plain black coffee and tea (no cream or milk).  No alcohol for 24 hours before you arrive. The night before surgery, stop any drinks that contain THC.  If your care team tells you to take medicine on the morning of surgery, it's okay to take it with a sip of water. No other medicines or drugs are allowed (including CBD oil)--follow your care team's instructions.  If you have questions the day of surgery, call your hospital or surgery center.   Preventing infection  Shower or bathe the night before and the morning of surgery. Follow the instructions your clinic gave you. (If no instructions, use regular soap.)  Don't shave or clip hair near your surgery site. We'll remove the hair if needed.  Don't smoke or vape the morning of surgery. No chewing tobacco for 6 hours before you arrive. A nicotine patch is okay. You may spit out nicotine gum when you arrive.  For some surgeries,  the surgeon will tell you to fully quit smoking and nicotine.  We will make every effort to keep you safe from infection. We will:  Clean our hands often with soap and water (or an alcohol-based hand rub).  Clean the skin at your surgery site with a special soap that kills germs.  Give you a special gown to keep you warm. (Cold raises the risk of infection.)  Wear hair covers, masks, gowns, and gloves during surgery.  Give antibiotic medicine, if prescribed. Not all surgeries need this medicine.  What to bring on the day of surgery  Photo ID and insurance card  Copy of your health care directive, if you have one  Glasses and hearing aids (bring cases)  You can't wear contacts during surgery  Inhaler and eye drops, if you use them (tell us about these when you arrive)  CPAP machine or breathing device, if you use them  A few personal items, if spending the night  If you have . . .  A pacemaker, ICD (cardiac defibrillator), or other implant: Bring the ID card.  An implanted stimulator: Bring the remote control.  A legal guardian: Bring a copy of the certified (court-stamped) guardianship papers.  Please remove any jewelry, including body piercings. Leave jewelry and other valuables at home.  If you're going home the day of surgery  You must have a support person drive you home. They should stay with you overnight, and they may need to help with your self-care.  If you don't have a support person, please tells us as soon as possible. We can help.  After surgery  If it's hard to control your pain or you need more pain medicine, please call your surgeon's office.  Questions?   If you have any questions for your care team, list them here:   ____________________________________________________________________________________________________________________________________________________________________________________________________________________________________________________________  For informational purposes only.  Not to replace the advice of your health care provider. Copyright   2003, 2019 [facility]. All rights reserved. Clinically reviewed by Peter Bauman MD. [employer] [id number] - REV 02/25.

## 2025-08-27 ENCOUNTER — TRANSFERRED RECORDS (OUTPATIENT)
Dept: HEALTH INFORMATION MANAGEMENT | Facility: CLINIC | Age: 75
End: 2025-08-27
Payer: COMMERCIAL